# Patient Record
Sex: FEMALE | Race: WHITE | NOT HISPANIC OR LATINO | Employment: FULL TIME | URBAN - METROPOLITAN AREA
[De-identification: names, ages, dates, MRNs, and addresses within clinical notes are randomized per-mention and may not be internally consistent; named-entity substitution may affect disease eponyms.]

---

## 2022-01-22 PROCEDURE — U0004 COV-19 TEST NON-CDC HGH THRU: HCPCS | Performed by: NURSE PRACTITIONER

## 2022-01-24 ENCOUNTER — TELEPHONE (OUTPATIENT)
Dept: URGENT CARE | Facility: CLINIC | Age: 42
End: 2022-01-24

## 2022-08-26 ENCOUNTER — OFFICE VISIT (OUTPATIENT)
Dept: INTERNAL MEDICINE | Facility: CLINIC | Age: 42
End: 2022-08-26

## 2022-08-26 VITALS
SYSTOLIC BLOOD PRESSURE: 124 MMHG | OXYGEN SATURATION: 96 % | BODY MASS INDEX: 22.19 KG/M2 | HEART RATE: 100 BPM | WEIGHT: 125.25 LBS | TEMPERATURE: 98.5 F | HEIGHT: 63 IN | DIASTOLIC BLOOD PRESSURE: 83 MMHG

## 2022-08-26 DIAGNOSIS — N89.8 VAGINAL DISCHARGE: ICD-10-CM

## 2022-08-26 DIAGNOSIS — F41.9 ANXIETY: ICD-10-CM

## 2022-08-26 DIAGNOSIS — Z11.59 NEED FOR HEPATITIS C SCREENING TEST: ICD-10-CM

## 2022-08-26 DIAGNOSIS — Z00.00 ENCOUNTER FOR MEDICAL EXAMINATION TO ESTABLISH CARE: Primary | ICD-10-CM

## 2022-08-26 DIAGNOSIS — G47.00 INSOMNIA, UNSPECIFIED TYPE: ICD-10-CM

## 2022-08-26 DIAGNOSIS — Z13.220 SCREENING FOR LIPID DISORDERS: ICD-10-CM

## 2022-08-26 DIAGNOSIS — F39 MOOD DISORDER: ICD-10-CM

## 2022-08-26 DIAGNOSIS — Z85.41 HISTORY OF CERVICAL CANCER: ICD-10-CM

## 2022-08-26 DIAGNOSIS — Z00.00 ANNUAL PHYSICAL EXAM: ICD-10-CM

## 2022-08-26 LAB
ALBUMIN SERPL-MCNC: 4.5 G/DL (ref 3.5–5.2)
ALBUMIN/GLOB SERPL: 2 G/DL
ALP SERPL-CCNC: 63 U/L (ref 39–117)
ALT SERPL W P-5'-P-CCNC: 11 U/L (ref 1–33)
ANION GAP SERPL CALCULATED.3IONS-SCNC: 10.5 MMOL/L (ref 5–15)
AST SERPL-CCNC: 19 U/L (ref 1–32)
BASOPHILS # BLD AUTO: 0.05 10*3/MM3 (ref 0–0.2)
BASOPHILS NFR BLD AUTO: 0.5 % (ref 0–1.5)
BILIRUB SERPL-MCNC: 0.2 MG/DL (ref 0–1.2)
BUN SERPL-MCNC: 6 MG/DL (ref 6–20)
BUN/CREAT SERPL: 9.1 (ref 7–25)
C TRACH RRNA CVX QL NAA+PROBE: NOT DETECTED
CALCIUM SPEC-SCNC: 9.9 MG/DL (ref 8.6–10.5)
CANDIDA SPECIES: POSITIVE
CHLORIDE SERPL-SCNC: 101 MMOL/L (ref 98–107)
CHOLEST SERPL-MCNC: 238 MG/DL (ref 0–200)
CO2 SERPL-SCNC: 25.5 MMOL/L (ref 22–29)
CREAT SERPL-MCNC: 0.66 MG/DL (ref 0.57–1)
DEPRECATED RDW RBC AUTO: 39.6 FL (ref 37–54)
EGFRCR SERPLBLD CKD-EPI 2021: 111.8 ML/MIN/1.73
EOSINOPHIL # BLD AUTO: 0.12 10*3/MM3 (ref 0–0.4)
EOSINOPHIL NFR BLD AUTO: 1.1 % (ref 0.3–6.2)
ERYTHROCYTE [DISTWIDTH] IN BLOOD BY AUTOMATED COUNT: 11.7 % (ref 12.3–15.4)
GARDNERELLA VAGINALIS: NEGATIVE
GLOBULIN UR ELPH-MCNC: 2.2 GM/DL
GLUCOSE SERPL-MCNC: 91 MG/DL (ref 65–99)
HCT VFR BLD AUTO: 44 % (ref 34–46.6)
HCV AB SER DONR QL: NORMAL
HDLC SERPL-MCNC: 50 MG/DL (ref 40–60)
HGB BLD-MCNC: 14.8 G/DL (ref 12–15.9)
IMM GRANULOCYTES # BLD AUTO: 0.03 10*3/MM3 (ref 0–0.05)
IMM GRANULOCYTES NFR BLD AUTO: 0.3 % (ref 0–0.5)
LDLC SERPL CALC-MCNC: 162 MG/DL (ref 0–100)
LDLC/HDLC SERPL: 3.18 {RATIO}
LYMPHOCYTES # BLD AUTO: 3.86 10*3/MM3 (ref 0.7–3.1)
LYMPHOCYTES NFR BLD AUTO: 35.1 % (ref 19.6–45.3)
MCH RBC QN AUTO: 31 PG (ref 26.6–33)
MCHC RBC AUTO-ENTMCNC: 33.6 G/DL (ref 31.5–35.7)
MCV RBC AUTO: 92.2 FL (ref 79–97)
MONOCYTES # BLD AUTO: 0.6 10*3/MM3 (ref 0.1–0.9)
MONOCYTES NFR BLD AUTO: 5.5 % (ref 5–12)
N GONORRHOEA RRNA SPEC QL NAA+PROBE: NOT DETECTED
NEUTROPHILS NFR BLD AUTO: 57.5 % (ref 42.7–76)
NEUTROPHILS NFR BLD AUTO: 6.33 10*3/MM3 (ref 1.7–7)
NRBC BLD AUTO-RTO: 0 /100 WBC (ref 0–0.2)
PLATELET # BLD AUTO: 387 10*3/MM3 (ref 140–450)
PMV BLD AUTO: 9.7 FL (ref 6–12)
POTASSIUM SERPL-SCNC: 3.6 MMOL/L (ref 3.5–5.2)
PROT SERPL-MCNC: 6.7 G/DL (ref 6–8.5)
RBC # BLD AUTO: 4.77 10*6/MM3 (ref 3.77–5.28)
SODIUM SERPL-SCNC: 137 MMOL/L (ref 136–145)
T VAGINALIS DNA VAG QL PROBE+SIG AMP: POSITIVE
TRIGL SERPL-MCNC: 146 MG/DL (ref 0–150)
TSH SERPL DL<=0.05 MIU/L-ACNC: 1.25 UIU/ML (ref 0.27–4.2)
VLDLC SERPL-MCNC: 26 MG/DL (ref 5–40)
WBC NRBC COR # BLD: 10.99 10*3/MM3 (ref 3.4–10.8)

## 2022-08-26 PROCEDURE — 87480 CANDIDA DNA DIR PROBE: CPT | Performed by: NURSE PRACTITIONER

## 2022-08-26 PROCEDURE — 87591 N.GONORRHOEAE DNA AMP PROB: CPT | Performed by: NURSE PRACTITIONER

## 2022-08-26 PROCEDURE — 85025 COMPLETE CBC W/AUTO DIFF WBC: CPT | Performed by: NURSE PRACTITIONER

## 2022-08-26 PROCEDURE — 80061 LIPID PANEL: CPT | Performed by: NURSE PRACTITIONER

## 2022-08-26 PROCEDURE — 87510 GARDNER VAG DNA DIR PROBE: CPT | Performed by: NURSE PRACTITIONER

## 2022-08-26 PROCEDURE — 99396 PREV VISIT EST AGE 40-64: CPT | Performed by: NURSE PRACTITIONER

## 2022-08-26 PROCEDURE — 84443 ASSAY THYROID STIM HORMONE: CPT | Performed by: NURSE PRACTITIONER

## 2022-08-26 PROCEDURE — 86803 HEPATITIS C AB TEST: CPT | Performed by: NURSE PRACTITIONER

## 2022-08-26 PROCEDURE — 80053 COMPREHEN METABOLIC PANEL: CPT | Performed by: NURSE PRACTITIONER

## 2022-08-26 PROCEDURE — 87660 TRICHOMONAS VAGIN DIR PROBE: CPT | Performed by: NURSE PRACTITIONER

## 2022-08-26 PROCEDURE — 87491 CHLMYD TRACH DNA AMP PROBE: CPT | Performed by: NURSE PRACTITIONER

## 2022-08-26 NOTE — PROGRESS NOTES
Chief Complaint  Establish care: cervical cancer   Subjective          Janee Mensah presents to [facility]  Anxiety  Presents for initial visit. Symptoms include depressed mood, excessive worry, irritability, nervous/anxious behavior and panic. Patient reports no chest pain, compulsions, confusion, decreased concentration, dizziness, dry mouth, feeling of choking, hyperventilation, impotence, insomnia, malaise, muscle tension, nausea, obsessions, palpitations, restlessness, shortness of breath or suicidal ideas.     Risk factors:  passed away 6 years ago  Treatments tried: currently on hydroxyzine and lamictal    Insomnia  This is a chronic problem. Pertinent negatives include no abdominal pain, anorexia, arthralgias, change in bowel habit, chest pain, chills, congestion, coughing, diaphoresis, fatigue, fever, headaches, joint swelling, myalgias, nausea, neck pain, numbness, rash, sore throat, swollen glands, urinary symptoms, vertigo, visual change, vomiting or weakness. Treatments tried: trazodone  The treatment provided significant relief.   Vaginal Discharge  The patient's primary symptoms include a genital odor and vaginal discharge. The patient's pertinent negatives include no genital itching, genital lesions, genital rash, missed menses, pelvic pain or vaginal bleeding. This is a new problem. The current episode started 1 to 4 weeks ago. Pertinent negatives include no abdominal pain, anorexia, back pain, chills, constipation, diarrhea, discolored urine, dysuria, fever, flank pain, frequency, headaches, hematuria, joint pain, joint swelling, nausea, painful intercourse, rash, sore throat, urgency or vomiting. The vaginal discharge was white, yellow and thick. There has been no bleeding. She has not been passing clots. She has not been passing tissue. Nothing aggravates the symptoms.       Previous PCP: did not have one  Specialist(s): OB/GYN yearly  "  COVID vaccine: never  Pneumonia vaccine: never  Shingles vaccine: never  Colon cancer screening: completed   Mammogram: completed  Pap Smear: completed      Cervical cancer - sees [facility] in [facility]   Pt is very difficult to understand while obtaining history. I repeated back her complaints to be sure we are on the same page.   Pt states that she follows with [facility] in [facility]. Pt would like to switch her oncology care to [place]. I have requested records from [facility] and can place the referral once I obtain them.     Allergic rhinitis:   Well controlled on current regimen         Current Outpatient Medications   Medication Instructions   • albuterol sulfate  (90 Base) MCG/ACT inhaler 2 puffs, Inhalation, Every 4 Hours PRN   • amoxicillin-clavulanate (AUGMENTIN) 875-125 MG per tablet 1 tablet, Oral, 2 Times Daily   • cetirizine (ZYRTEC) 10 mg, Oral, Daily   • hydrOXYzine pamoate (VISTARIL) 25 MG capsule 1 capsule, Oral, 3 Times Daily PRN   • lamoTRIgine (LaMICtal) 100 MG tablet 1 tablet, Oral, Daily   • loratadine (CLARITIN) 10 mg, Oral, Daily   • metroNIDAZOLE (FLAGYL) 500 mg, Oral, 2 Times Daily   • traZODone (DESYREL) 100 mg, Oral, Every Night at Bedtime       The following portions of the patient's history were reviewed and updated as appropriate: allergies, current medications, past family history, past medical history, past social history, past surgical history, and problem list.    Objective   Vital Signs:   /83   Pulse 100   Temp 98.5 °F (36.9 °C) (Temporal)   Ht 160 cm (63\")   Wt 56.8 kg (125 lb 4 oz)   SpO2 96%   BMI 22.19 kg/m²     Wt Readings from Last 3 Encounters:   08/26/22 56.8 kg (125 lb 4 oz)   08/20/22 54.4 kg (120 lb)   01/22/22 56.7 kg (125 lb)     BP Readings from Last 3 Encounters:   08/26/22 124/83   08/20/22 94/62   05/05/22 122/78     Physical Exam   Appearance: No acute distress, well-nourished  Head: normocephalic, atraumatic  Eyes: " extraocular movements intact, no scleral icterus, no conjunctival injection  Ears, Nose, and Throat: external ears normal, nares patent, moist mucous membranes  Cardiovascular: regular rate and rhythm. no murmurs, rubs, or gallops. no edema  Respiratory: breathing comfortably, symmetric chest rise, clear to auscultation bilaterally. No wheezes, rales, or rhonchi.  Neuro: alert and oriented to time, place, and person. Normal gait  Psych: normal mood and affect     Result Review :   The following data was reviewed by: ANGY Limon on 08/26/2022:  Common labs    Common Labsle 8/26/22 8/26/22 8/26/22    1625 1625 1625   Glucose   91   BUN   6   Creatinine   0.66   Sodium   137   Potassium   3.6   Chloride   101   Calcium   9.9   Albumin   4.50   Total Bilirubin   0.2   Alkaline Phosphatase   63   AST (SGOT)   19   ALT (SGPT)   11   WBC 10.99 (A)     Hemoglobin 14.8     Hematocrit 44.0     Platelets 387     Total Cholesterol  238 (A)    Triglycerides  146    HDL Cholesterol  50    LDL Cholesterol   162 (A)    (A) Abnormal value                   Lab Results   Component Value Date    COVID19 Detected (C) 01/22/2022       Procedures        Assessment and Plan    Diagnoses and all orders for this visit:    1. Encounter for medical examination to establish care (Primary)    2. History of cervical cancer  -     Ambulatory Referral to Obstetrics / Gynecology    3. Vaginal discharge  -     Chlamydia trachomatis, Neisseria gonorrhoeae, PCR - , Urine, Random Void  -     Gardnerella vaginalis, Trichomonas vaginalis, Candida albicans, DNA - Swab, Vagina    4. Need for hepatitis C screening test  -     Hepatitis C Antibody    5. Annual physical exam  -     CBC & Differential  -     TSH Rfx On Abnormal To Free T4  -     Comprehensive Metabolic Panel    6. Screening for lipid disorders  -     Lipid Panel    7. Insomnia, unspecified type    8. Mood disorder (HCC)    9. Anxiety        Advised on diet, physical activity,  sunscreen, helmet, texting and driving, etc    Medications Discontinued During This Encounter   Medication Reason   • hydrOXYzine (ATARAX) 25 MG tablet Duplicate order          Follow Up   Return in about 6 months (around 2/26/2023).  Patient was given instructions and counseling regarding her condition or for health maintenance advice. Please see specific information pulled into the AVS if appropriate.       ANGY Limon  08/30/22  08:06 EDT

## 2022-08-28 DIAGNOSIS — A59.01 TRICHOMONAS VAGINALIS (TV) INFECTION: Primary | ICD-10-CM

## 2022-08-28 DIAGNOSIS — B37.31 VAGINAL YEAST INFECTION: ICD-10-CM

## 2022-08-28 RX ORDER — FLUCONAZOLE 150 MG/1
150 TABLET ORAL ONCE
Qty: 1 TABLET | Refills: 0 | Status: SHIPPED | OUTPATIENT
Start: 2022-08-28 | End: 2022-08-28

## 2022-08-28 RX ORDER — METRONIDAZOLE 500 MG/1
500 TABLET ORAL 2 TIMES DAILY
Qty: 14 TABLET | Refills: 0 | Status: SHIPPED | OUTPATIENT
Start: 2022-08-28 | End: 2022-09-04

## 2022-08-29 ENCOUNTER — TELEPHONE (OUTPATIENT)
Dept: INTERNAL MEDICINE | Facility: CLINIC | Age: 42
End: 2022-08-29

## 2022-08-29 NOTE — TELEPHONE ENCOUNTER
Left message for patient to call back clinic.    HUB TO READ:    ----- Message from ANGY Limon sent at 8/28/2022  7:10 PM EDT -----  Cholesterol levels are elevated nearing need for medication. I recommend healthy diet and increased physical activity to prevent the need for medications. We will recheck lipids in 3 months. Please be sure pt has appt.   I'll put diet recommendations below        Gonorrhea and Chlamydia negative.      Positive for trichomonas. This is a sexually transmitted infection. Partner will also need to be treated. I am sending in 7 days of flagyl to the pharmacy. Remain abstinent until treatment is complete with yourself and partner.   Also positive for yeast. I am sending in Diflucan as well.

## 2022-08-29 NOTE — TELEPHONE ENCOUNTER
----- Message from ANGY Limon sent at 8/28/2022  7:10 PM EDT -----  Cholesterol levels are elevated nearing need for medication. I recommend healthy diet and increased physical activity to prevent the need for medications. We will recheck lipids in 3 months. Please be sure pt has appt.   I'll put diet recommendations below      Gonorrhea and Chlamydia negative.     Positive for trichomonas. This is a sexually transmitted infection. Partner will also need to be treated. I am sending in 7 days of flagyl to the pharmacy. Remain abstinent until treatment is complete with yourself and partner.   Also positive for yeast. I am sending in Diflucan as well.

## 2022-08-30 PROBLEM — G47.00 INSOMNIA: Status: ACTIVE | Noted: 2022-08-30

## 2022-08-30 PROBLEM — F39 MOOD DISORDER: Status: ACTIVE | Noted: 2022-08-30

## 2022-08-30 PROBLEM — F41.9 ANXIETY: Status: ACTIVE | Noted: 2022-08-30

## 2022-08-30 NOTE — TELEPHONE ENCOUNTER
2ND ATTEMPTED TO CONTACT PATIENT. LEFT MESSAGE TO CALL BACK.    HUB OK TO READ/ADVISE:     ----- Message from ANGY Limon sent at 8/28/2022  7:10 PM EDT -----  Cholesterol levels are elevated nearing need for medication. I recommend healthy diet and increased physical activity to prevent the need for medications. We will recheck lipids in 3 months. Please be sure pt has appt.   I'll put diet recommendations below        Gonorrhea and Chlamydia negative.      Positive for trichomonas. This is a sexually transmitted infection. Partner will also need to be treated. I am sending in 7 days of flagyl to the pharmacy. Remain abstinent until treatment is complete with yourself and partner.   Also positive for yeast. I am sending in Diflucan as well.

## 2022-08-30 NOTE — TELEPHONE ENCOUNTER
Patient is aware of results. There were no further questions or concerns noted.     Results were also mailed as requested by the patient.

## 2022-09-30 ENCOUNTER — OFFICE VISIT (OUTPATIENT)
Dept: INTERNAL MEDICINE | Facility: CLINIC | Age: 42
End: 2022-09-30

## 2022-09-30 VITALS
SYSTOLIC BLOOD PRESSURE: 112 MMHG | TEMPERATURE: 97.8 F | HEART RATE: 80 BPM | BODY MASS INDEX: 22.75 KG/M2 | HEIGHT: 63 IN | WEIGHT: 128.38 LBS | OXYGEN SATURATION: 98 % | DIASTOLIC BLOOD PRESSURE: 74 MMHG

## 2022-09-30 DIAGNOSIS — N89.8 VAGINAL DISCHARGE: ICD-10-CM

## 2022-09-30 DIAGNOSIS — Z11.3 SCREENING EXAMINATION FOR STD (SEXUALLY TRANSMITTED DISEASE): Primary | ICD-10-CM

## 2022-09-30 LAB
C TRACH RRNA CVX QL NAA+PROBE: NOT DETECTED
CANDIDA SPECIES: NEGATIVE
GARDNERELLA VAGINALIS: POSITIVE
N GONORRHOEA RRNA SPEC QL NAA+PROBE: NOT DETECTED
T VAGINALIS DNA VAG QL PROBE+SIG AMP: NEGATIVE

## 2022-09-30 PROCEDURE — 87510 GARDNER VAG DNA DIR PROBE: CPT | Performed by: NURSE PRACTITIONER

## 2022-09-30 PROCEDURE — 87660 TRICHOMONAS VAGIN DIR PROBE: CPT | Performed by: NURSE PRACTITIONER

## 2022-09-30 PROCEDURE — 99213 OFFICE O/P EST LOW 20 MIN: CPT | Performed by: NURSE PRACTITIONER

## 2022-09-30 PROCEDURE — 87480 CANDIDA DNA DIR PROBE: CPT | Performed by: NURSE PRACTITIONER

## 2022-09-30 PROCEDURE — 87491 CHLMYD TRACH DNA AMP PROBE: CPT | Performed by: NURSE PRACTITIONER

## 2022-09-30 PROCEDURE — 87591 N.GONORRHOEAE DNA AMP PROB: CPT | Performed by: NURSE PRACTITIONER

## 2022-09-30 RX ORDER — LAMOTRIGINE 150 MG/1
150 TABLET ORAL DAILY
COMMUNITY
Start: 2022-09-06

## 2022-10-01 DIAGNOSIS — B96.89 BACTERIAL VAGINOSIS: Primary | ICD-10-CM

## 2022-10-01 DIAGNOSIS — N76.0 BACTERIAL VAGINOSIS: Primary | ICD-10-CM

## 2022-10-01 RX ORDER — CLINDAMYCIN PHOSPHATE 20 MG/G
1 CREAM VAGINAL NIGHTLY
Qty: 40 G | Refills: 0 | Status: SHIPPED | OUTPATIENT
Start: 2022-10-01 | End: 2022-10-08

## 2022-10-03 ENCOUNTER — TELEPHONE (OUTPATIENT)
Dept: INTERNAL MEDICINE | Facility: CLINIC | Age: 42
End: 2022-10-03

## 2022-10-03 ENCOUNTER — PRIOR AUTHORIZATION (OUTPATIENT)
Dept: INTERNAL MEDICINE | Facility: CLINIC | Age: 42
End: 2022-10-03

## 2022-10-03 NOTE — TELEPHONE ENCOUNTER
Left message for patient to return call to clinic.      HUB TO READ:      Positive for bacterial vaginosis.  I sent in vaginal clindamycin that patient is can insert nightly for the next 7 days.  She may return for a new vaginal screen after completion of treatment if symptoms persist

## 2022-10-04 NOTE — TELEPHONE ENCOUNTER
PATIENT GIVEN HUB TO READ MESSAGE. PATIENT STATES SHE UNDERSTAND AND DOESN'T ANY FOLLOW UP QUESTIONS AT THIS TIME.

## 2022-10-06 ENCOUNTER — TELEPHONE (OUTPATIENT)
Dept: INTERNAL MEDICINE | Facility: CLINIC | Age: 42
End: 2022-10-06

## 2022-10-06 NOTE — TELEPHONE ENCOUNTER
ATTEMPTED TO CONTACT PATIENT LEFT MESSAGE FOR PATIENT TO CALL THE OFFICE BACK.       HUB OK TO READ/ADVISE:    Patient has overdue labs for:   Hepatitis panel, acute  HIV-1/O/2 ANTIGEN/ANTIBODY, 4TH GENERATION   RPR    These orders can be collected at one of the locations listed below:    [facility]:  [address] [place] [address]    Phone: [phone]      [facility]:  [address] [place] [address]    Phone: [phone]    Hours of Operations: 6:30 a.m. - 5:00 p.m. (Monday-Friday)      No appointment is required at either location.

## 2022-10-07 ENCOUNTER — CLINICAL SUPPORT (OUTPATIENT)
Dept: INTERNAL MEDICINE | Facility: CLINIC | Age: 42
End: 2022-10-07

## 2022-10-07 DIAGNOSIS — G47.00 INSOMNIA, UNSPECIFIED TYPE: ICD-10-CM

## 2022-10-07 DIAGNOSIS — F41.9 ANXIETY: ICD-10-CM

## 2022-10-07 LAB
HAV IGM SERPL QL IA: NORMAL
HBV CORE IGM SERPL QL IA: NORMAL
HBV SURFACE AG SERPL QL IA: NORMAL
HCV AB SER DONR QL: NORMAL
HIV1+2 AB SER QL: NORMAL

## 2022-10-07 PROCEDURE — G0432 EIA HIV-1/HIV-2 SCREEN: HCPCS | Performed by: NURSE PRACTITIONER

## 2022-10-07 PROCEDURE — 86592 SYPHILIS TEST NON-TREP QUAL: CPT | Performed by: NURSE PRACTITIONER

## 2022-10-07 PROCEDURE — 36415 COLL VENOUS BLD VENIPUNCTURE: CPT | Performed by: NURSE PRACTITIONER

## 2022-10-07 PROCEDURE — 80074 ACUTE HEPATITIS PANEL: CPT | Performed by: NURSE PRACTITIONER

## 2022-10-08 LAB — RPR SER QL: NORMAL

## 2022-10-12 ENCOUNTER — TELEPHONE (OUTPATIENT)
Dept: INTERNAL MEDICINE | Facility: CLINIC | Age: 42
End: 2022-10-12

## 2022-10-12 NOTE — TELEPHONE ENCOUNTER
Attempted to contact patient. Left message to call the office back.    HUB OK TO READ/ADVISE:  Syphilis negative.      HIV negative.      Hepatitis panel non reactive.

## 2022-10-12 NOTE — TELEPHONE ENCOUNTER
Patient returned call, informed of results.  Patient verbalized understanding.    Patient notified medication was sent to the pharmacy. Patient stated medication was not approved by insurance.    PA was done for medication and approved.  Approval letter has been faxed to the pharmacy.    Pt aware.

## 2022-10-12 NOTE — TELEPHONE ENCOUNTER
ATTEMPTED TO CONTACT PATIENT REGARDING LAB RESULTS- NO ANSWER, LEFT LVM FOR PATIENT TO CALL OFFICE BACK.

## 2022-10-12 NOTE — TELEPHONE ENCOUNTER
----- Message from ANGY Limon sent at 10/12/2022  8:29 AM EDT -----  Syphilis negative.     HIV negative.     Hepatitis panel non reactive.

## 2022-10-13 NOTE — TELEPHONE ENCOUNTER
2nd attempt to contact patient. Left message to call the office back.     HUB OK TO READ/ADVISE:  Syphilis negative.      HIV negative.      Hepatitis panel non reactive.

## 2022-10-14 NOTE — TELEPHONE ENCOUNTER
3RD ATTEMPT TO CONTACT PATIENT. LEFT MESSAGE TO CALL THE OFFICE BACK.     HUB OK TO READ/ADVISE:  Syphilis negative.      HIV negative.      Hepatitis panel non reactive.

## 2022-10-19 ENCOUNTER — TELEPHONE (OUTPATIENT)
Dept: INTERNAL MEDICINE | Facility: CLINIC | Age: 42
End: 2022-10-19

## 2022-10-19 NOTE — TELEPHONE ENCOUNTER
3RD -11-22-22 REFAXED MEDICAL RECORDS REQUEST TO [facility]/        ----- Message from ANGY Limon sent at 8/26/2022  4:16 PM EDT -----  [facility] in [facility]       2ND -10/19/22 REFAXED MEDICAL RECORDS REQUEST TO [facility]  [facility]/    1ST - 8/29/22 FAXED MEDICAL RECORDS REQUEST TO [facility]/

## 2022-10-28 ENCOUNTER — OFFICE VISIT (OUTPATIENT)
Dept: INTERNAL MEDICINE | Facility: CLINIC | Age: 42
End: 2022-10-28

## 2022-10-28 VITALS
DIASTOLIC BLOOD PRESSURE: 70 MMHG | HEART RATE: 81 BPM | HEIGHT: 63 IN | TEMPERATURE: 98.3 F | SYSTOLIC BLOOD PRESSURE: 104 MMHG | BODY MASS INDEX: 22.39 KG/M2 | OXYGEN SATURATION: 98 % | WEIGHT: 126.38 LBS

## 2022-10-28 DIAGNOSIS — N76.0 BACTERIAL VAGINOSIS: ICD-10-CM

## 2022-10-28 DIAGNOSIS — R09.81 CONGESTION OF NASAL SINUS: ICD-10-CM

## 2022-10-28 DIAGNOSIS — R30.0 DYSURIA: Primary | ICD-10-CM

## 2022-10-28 DIAGNOSIS — B96.89 BACTERIAL VAGINOSIS: ICD-10-CM

## 2022-10-28 LAB
BILIRUB BLD-MCNC: NEGATIVE MG/DL
BILIRUB UR QL STRIP: NEGATIVE
CANDIDA SPECIES: NEGATIVE
CLARITY UR: CLEAR
CLARITY, POC: CLEAR
COLOR UR: ABNORMAL
COLOR UR: YELLOW
EXPIRATION DATE: ABNORMAL
GARDNERELLA VAGINALIS: POSITIVE
GLUCOSE UR STRIP-MCNC: NEGATIVE MG/DL
GLUCOSE UR STRIP-MCNC: NEGATIVE MG/DL
HGB UR QL STRIP.AUTO: NEGATIVE
KETONES UR QL STRIP: NEGATIVE
KETONES UR QL: NEGATIVE
LEUKOCYTE EST, POC: ABNORMAL
LEUKOCYTE ESTERASE UR QL STRIP.AUTO: ABNORMAL
Lab: ABNORMAL
NITRITE UR QL STRIP: NEGATIVE
NITRITE UR-MCNC: NEGATIVE MG/ML
PH UR STRIP.AUTO: 6 [PH] (ref 5–8)
PH UR: 6 [PH] (ref 5–8)
PROT UR QL STRIP: NEGATIVE
PROT UR STRIP-MCNC: ABNORMAL MG/DL
RBC # UR STRIP: NEGATIVE /UL
SP GR UR STRIP: 1.02 (ref 1–1.03)
SP GR UR: 1.03 (ref 1–1.03)
T VAGINALIS DNA VAG QL PROBE+SIG AMP: NEGATIVE
UROBILINOGEN UR QL STRIP: ABNORMAL
UROBILINOGEN UR QL: NORMAL

## 2022-10-28 PROCEDURE — 99214 OFFICE O/P EST MOD 30 MIN: CPT | Performed by: NURSE PRACTITIONER

## 2022-10-28 PROCEDURE — 87510 GARDNER VAG DNA DIR PROBE: CPT | Performed by: NURSE PRACTITIONER

## 2022-10-28 PROCEDURE — 87186 SC STD MICRODIL/AGAR DIL: CPT | Performed by: NURSE PRACTITIONER

## 2022-10-28 PROCEDURE — 87077 CULTURE AEROBIC IDENTIFY: CPT | Performed by: NURSE PRACTITIONER

## 2022-10-28 PROCEDURE — 81001 URINALYSIS AUTO W/SCOPE: CPT | Performed by: NURSE PRACTITIONER

## 2022-10-28 PROCEDURE — 87480 CANDIDA DNA DIR PROBE: CPT | Performed by: NURSE PRACTITIONER

## 2022-10-28 PROCEDURE — 87660 TRICHOMONAS VAGIN DIR PROBE: CPT | Performed by: NURSE PRACTITIONER

## 2022-10-28 PROCEDURE — 87086 URINE CULTURE/COLONY COUNT: CPT | Performed by: NURSE PRACTITIONER

## 2022-10-28 RX ORDER — PSEUDOEPHEDRINE HCL 120 MG/1
120 TABLET, FILM COATED, EXTENDED RELEASE ORAL EVERY 12 HOURS
Qty: 10 TABLET | Refills: 0 | Status: SHIPPED | OUTPATIENT
Start: 2022-10-28 | End: 2022-11-21

## 2022-10-28 NOTE — PROGRESS NOTES
Chief Complaint  Personal Problem    Subjective          Janee Mensah presents to [facility]  Vaginal Discharge  The patient's primary symptoms include vaginal discharge. The patient's pertinent negatives include no genital itching, genital lesions, genital odor, genital rash, missed menses, pelvic pain or vaginal bleeding. This is a recurrent problem. The current episode started 1 to 4 weeks ago. The problem occurs constantly. Associated symptoms include dysuria. Pertinent negatives include no abdominal pain, anorexia, back pain, chills, constipation, diarrhea, discolored urine, fever, flank pain, frequency, headaches, hematuria, joint pain, joint swelling, nausea, painful intercourse, rash, sore throat, urgency or vomiting. The vaginal discharge was watery and thick. She has tried nothing for the symptoms. She is sexually active.   Sinusitis  This is a new problem. The current episode started in the past 7 days. There has been no fever. Associated symptoms include congestion. Pertinent negatives include no chills, coughing, diaphoresis, ear pain, headaches, hoarse voice, neck pain, shortness of breath, sinus pressure, sneezing, sore throat or swollen glands. Past treatments include oral decongestants. The treatment provided moderate relief.         Current Outpatient Medications   Medication Instructions   • albuterol sulfate  (90 Base) MCG/ACT inhaler 2 puffs, Inhalation, Every 4 Hours PRN   • cetirizine (ZYRTEC) 10 mg, Oral, Daily   • hydrOXYzine pamoate (VISTARIL) 25 MG capsule 1 capsule, Oral, 3 Times Daily PRN   • lamoTRIgine (LAMICTAL) 150 mg, Oral, Daily   • loratadine (CLARITIN) 10 mg, Oral, Daily   • pseudoephedrine (SUDAFED 12 HOUR) 120 mg, Oral, Every 12 Hours   • traZODone (DESYREL) 100 mg, Oral, Every Night at Bedtime       The following portions of the patient's history were reviewed and updated as appropriate: allergies, current  "medications, past family history, past medical history, past social history, past surgical history, and problem list.    Objective   Vital Signs:   /70   Pulse 81   Temp 98.3 °F (36.8 °C) (Temporal)   Ht 160 cm (63\")   Wt 57.3 kg (126 lb 6 oz)   SpO2 98%   BMI 22.39 kg/m²     Wt Readings from Last 3 Encounters:   10/28/22 57.3 kg (126 lb 6 oz)   09/30/22 58.2 kg (128 lb 6 oz)   08/26/22 56.8 kg (125 lb 4 oz)     BP Readings from Last 3 Encounters:   10/28/22 104/70   09/30/22 112/74   08/26/22 124/83     Physical Exam  HENT:      Nose: Congestion present.        Appearance: No acute distress, well-nourished  Head: normocephalic, atraumatic  Eyes: extraocular movements intact, no scleral icterus, no conjunctival injection  Ears, Nose, and Throat:   Cardiovascular: regular rate and rhythm. no murmurs, rubs, or gallops. no edema  Respiratory: breathing comfortably, symmetric chest rise, clear to auscultation bilaterally. No wheezes, rales, or rhonchi.  Neuro: alert and oriented to time, place, and person. Normal gait  Psych: normal mood and affect     Result Review :   The following data was reviewed by: ANGY Limon on 10/28/2022:  Common labs    Common Labs 8/26/22 8/26/22 8/26/22    1625 1625 1625   Glucose   91   BUN   6   Creatinine   0.66   Sodium   137   Potassium   3.6   Chloride   101   Calcium   9.9   Albumin   4.50   Total Bilirubin   0.2   Alkaline Phosphatase   63   AST (SGOT)   19   ALT (SGPT)   11   WBC 10.99 (A)     Hemoglobin 14.8     Hematocrit 44.0     Platelets 387     Total Cholesterol  238 (A)    Triglycerides  146    HDL Cholesterol  50    LDL Cholesterol   162 (A)    (A) Abnormal value                   Lab Results   Component Value Date    COVID19 Detected (C) 01/22/2022    BILIRUBINUR Negative 10/28/2022       Procedures        Assessment and Plan    Diagnoses and all orders for this visit:    1. Dysuria (Primary)  -     Gardnerella vaginalis, Trichomonas vaginalis, " Candida albicans, DNA - Swab, Vagina  -     POCT urinalysis dipstick, automated  -     Urine Culture - Urine, Urine, Clean Catch  -     Urinalysis With Microscopic - Urine, Clean Catch    2. Bacterial vaginosis  -     Gardnerella vaginalis, Trichomonas vaginalis, Candida albicans, DNA - Swab, Vagina  -     POCT urinalysis dipstick, automated    3. Congestion of nasal sinus  -     pseudoephedrine (Sudafed 12 Hour) 120 MG 12 hr tablet; Take 1 tablet by mouth Every 12 (Twelve) Hours.  Dispense: 10 tablet; Refill: 0      Wants repeat BV test. Full STD panel performed 1 week ago. Negative GC and trich.     There are no discontinued medications.       Follow Up   Return if symptoms worsen or fail to improve.  Patient was given instructions and counseling regarding her condition or for health maintenance advice. Please see specific information pulled into the AVS if appropriate.       Carlee Rush, ANGY  10/31/22  07:48 EDT

## 2022-10-29 LAB
BACTERIA UR QL AUTO: ABNORMAL /HPF
HYALINE CASTS UR QL AUTO: ABNORMAL /LPF
RBC # UR STRIP: ABNORMAL /HPF
REF LAB TEST METHOD: ABNORMAL
SQUAMOUS #/AREA URNS HPF: ABNORMAL /HPF
WBC # UR STRIP: ABNORMAL /HPF

## 2022-10-30 LAB — BACTERIA SPEC AEROBE CULT: ABNORMAL

## 2022-10-31 ENCOUNTER — TELEPHONE (OUTPATIENT)
Dept: INTERNAL MEDICINE | Facility: CLINIC | Age: 42
End: 2022-10-31

## 2022-10-31 DIAGNOSIS — N39.0 ACUTE UTI (URINARY TRACT INFECTION): Primary | ICD-10-CM

## 2022-10-31 RX ORDER — NITROFURANTOIN 25; 75 MG/1; MG/1
100 CAPSULE ORAL 2 TIMES DAILY
Qty: 10 CAPSULE | Refills: 0 | Status: SHIPPED | OUTPATIENT
Start: 2022-10-31 | End: 2022-11-05

## 2022-10-31 NOTE — TELEPHONE ENCOUNTER
ATTEMPTED TO CONTACT PT REGARDING LAB RESULTS. LEFT VM TO CALL OUR OFFICE BACK.     HUB OK TO READ/ADVISE:  ----- Message from ANGY Limon sent at 10/31/2022  8:14 AM EDT -----  E coli noted on urine culture.   Since patient is symptomatic. Will send in macrobid x 5 days.

## 2022-10-31 NOTE — TELEPHONE ENCOUNTER
----- Message from ANGY Limon sent at 10/31/2022  8:14 AM EDT -----  E coli noted on urine culture.   Since patient is symptomatic. Will send in macrobid x 5 days.

## 2022-11-04 ENCOUNTER — HOSPITAL ENCOUNTER (EMERGENCY)
Facility: HOSPITAL | Age: 42
Discharge: HOME | End: 2022-11-04
Attending: EMERGENCY MEDICINE | Admitting: EMERGENCY MEDICINE

## 2022-11-04 VITALS
TEMPERATURE: 98.2 F | OXYGEN SATURATION: 99 % | HEART RATE: 81 BPM | RESPIRATION RATE: 18 BRPM | SYSTOLIC BLOOD PRESSURE: 128 MMHG | HEIGHT: 63 IN | WEIGHT: 126.98 LBS | BODY MASS INDEX: 22.5 KG/M2 | DIASTOLIC BLOOD PRESSURE: 78 MMHG

## 2022-11-04 DIAGNOSIS — L03.211 FACIAL CELLULITIS: Primary | ICD-10-CM

## 2022-11-04 PROCEDURE — 99283 EMERGENCY DEPT VISIT LOW MDM: CPT

## 2022-11-04 RX ORDER — HYDROCODONE BITARTRATE AND ACETAMINOPHEN 5; 325 MG/1; MG/1
1 TABLET ORAL EVERY 6 HOURS PRN
Qty: 8 TABLET | Refills: 0 | Status: SHIPPED | OUTPATIENT
Start: 2022-11-04 | End: 2023-01-20

## 2022-11-04 RX ORDER — CLINDAMYCIN HYDROCHLORIDE 150 MG/1
450 CAPSULE ORAL 3 TIMES DAILY
Qty: 90 CAPSULE | Refills: 0 | Status: SHIPPED | OUTPATIENT
Start: 2022-11-04 | End: 2022-11-14

## 2022-11-04 RX ADMIN — CLINDAMYCIN HYDROCHLORIDE 450 MG: 300 CAPSULE ORAL at 10:45

## 2022-11-04 NOTE — ED PROVIDER NOTES
Time: 10:05 AM EDT    Chief Complaint: cyst on face  History provided by: pt  History is limited by: N/A     History of Present Illness:  Patient is a 42 y.o. year old female who presents to the emergency department with cyst on the right side of the face. She reports that the cyst has been present for about a week. Pt states that pain is 4/10. Pt's pain reproduces with touch. Denies any pain in the teeth. Denies any know allergies specifically to any antibiotics.      History provided by:  Patient   used: No        Similar Symptoms Previously: no  Recently seen: no      Patient Care Team  Primary Care Provider: Carlee Rush APRN    Past Medical History:     No Known Allergies  Past Medical History:   Diagnosis Date   • Anxiety and depression    • Cervical cancer, FIGO stage I (HCC)      History reviewed. No pertinent surgical history.  History reviewed. No pertinent family history.    Home Medications:  Prior to Admission medications    Medication Sig Start Date End Date Taking? Authorizing Provider   albuterol sulfate  (90 Base) MCG/ACT inhaler Inhale 2 puffs Every 4 (Four) Hours As Needed for Wheezing or Shortness of Air. 1/22/22   Sierra Kevin APRN   cetirizine (zyrTEC) 10 MG tablet Take 10 mg by mouth Daily. 10/13/21   Emergency, Nurse VIPUL Barrera   hydrOXYzine pamoate (VISTARIL) 25 MG capsule Take 1 capsule by mouth 3 (Three) Times a Day As Needed. 9/16/21   Emergency, Nurse Bruce RN   lamoTRIgine (LaMICtal) 150 MG tablet Take 150 mg by mouth Daily. 9/6/22   Provider, MD Payam   loratadine (CLARITIN) 10 MG tablet Take 1 tablet by mouth Daily. 8/20/22   Moiz Krishnan DO   nitrofurantoin, macrocrystal-monohydrate, (Macrobid) 100 MG capsule Take 1 capsule by mouth 2 (Two) Times a Day for 5 days. 10/31/22 11/5/22  Carlee Rush APRN   pseudoephedrine (Sudafed 12 Hour) 120 MG 12 hr tablet Take 1 tablet by mouth Every 12 (Twelve) Hours. 10/28/22    "Carlee Rush APRN   traZODone (DESYREL) 100 MG tablet Take 100 mg by mouth every night at bedtime. 4/27/22   Emergency, Nurse Bruce, RN        Social History:   Social History     Tobacco Use   • Smoking status: Every Day     Packs/day: 1.00     Years: 30.00     Pack years: 30.00     Types: Cigarettes   • Smokeless tobacco: Never   Vaping Use   • Vaping Use: Never used   Substance Use Topics   • Alcohol use: Not Currently   • Drug use: Never         Review of Systems:  Review of Systems   Constitutional: Negative for chills and fever.   HENT: Negative for congestion, dental problem, ear pain and sore throat.    Eyes: Negative for pain.   Respiratory: Negative for cough, chest tightness and shortness of breath.    Cardiovascular: Negative for chest pain.   Gastrointestinal: Negative for abdominal pain, diarrhea, nausea and vomiting.   Genitourinary: Negative for flank pain and hematuria.   Musculoskeletal: Negative for joint swelling.   Skin: Negative for pallor.   Neurological: Negative for seizures and headaches.   All other systems reviewed and are negative.       Physical Exam:  /76 (BP Location: Left arm, Patient Position: Sitting)   Pulse 83   Temp 98.2 °F (36.8 °C) (Oral)   Resp 18   Ht 160 cm (63\")   Wt 57.6 kg (126 lb 15.8 oz)   LMP 10/12/2022 (Approximate)   SpO2 99%   BMI 22.49 kg/m²     Physical Exam  Vitals and nursing note reviewed.   Constitutional:       General: She is not in acute distress.     Appearance: Normal appearance. She is not toxic-appearing.      Comments: Pt had a hard time and difficulty tolerating the exam   HENT:      Head: Normocephalic and atraumatic.      Jaw: There is normal jaw occlusion.      Nose:        Comments:     Eyes:      General: Lids are normal.      Extraocular Movements: Extraocular movements intact.      Conjunctiva/sclera: Conjunctivae normal.      Pupils: Pupils are equal, round, and reactive to light.   Cardiovascular:      Rate and Rhythm: " Normal rate and regular rhythm.      Pulses: Normal pulses.      Heart sounds: Normal heart sounds.   Pulmonary:      Effort: Pulmonary effort is normal. No respiratory distress.      Breath sounds: Normal breath sounds. No wheezing or rhonchi.   Abdominal:      General: Abdomen is flat.      Palpations: Abdomen is soft.      Tenderness: There is no abdominal tenderness. There is no guarding or rebound.   Musculoskeletal:         General: Normal range of motion.      Cervical back: Normal range of motion and neck supple.      Right lower leg: No edema.      Left lower leg: No edema.   Skin:     General: Skin is warm and dry.   Neurological:      Mental Status: She is alert and oriented to person, place, and time. Mental status is at baseline.   Psychiatric:         Mood and Affect: Mood normal.                Medications in the Emergency Department:  Medications   clindamycin (CLEOCIN) capsule 450 mg (has no administration in time range)        Labs  Lab Results (last 24 hours)     ** No results found for the last 24 hours. **           Imaging:  No Radiology Exams Resulted Within Past 24 Hours    Procedures:  Procedures    Progress                            Medical Decision Making:  MDM  Number of Diagnoses or Management Options  Facial cellulitis  Diagnosis management comments: In summary this is a 42-year-old female who presents to the emergency department for evaluation of right facial swelling.  She complains of pain, swelling, redness to the right face.  On examination she does have a cellulitis of the right nasolabial fold.  No definite fluctuance however area was very tender and examination was difficult due to the tenderness.  Patient was given initial dose of oral clindamycin in the emergency department and will be prescribed clindamycin for home treatment.  I instructed her to follow-up with her PCP beginning of this upcoming week for reevaluation.  She is to return to the emergency department should the  swelling/redness spread to more of the face.  Very strict return to ER and follow-up instructions have been provided to the patient.         Final diagnoses:   Facial cellulitis        Disposition:  ED Disposition     ED Disposition   Discharge    Condition   Stable    Comment   --             This medical record created using voice recognition software.        Documentation assistance provided by Humaira Hernandez acting as scribe for Max Gaffney MD. Information recorded by the scribe was done at my direction and has been verified and validated by me.          Humaira Hernandez  11/04/22 1028       Humaira Hernandez  11/04/22 1028       Humaira Hernandez  11/04/22 1029       Max Gaffney MD  11/04/22 1038

## 2022-12-02 ENCOUNTER — OFFICE VISIT (OUTPATIENT)
Dept: OBSTETRICS AND GYNECOLOGY | Facility: CLINIC | Age: 42
End: 2022-12-02

## 2022-12-02 VITALS — HEART RATE: 94 BPM | DIASTOLIC BLOOD PRESSURE: 77 MMHG | SYSTOLIC BLOOD PRESSURE: 111 MMHG

## 2022-12-02 DIAGNOSIS — Z01.419 WELL WOMAN EXAM WITH ROUTINE GYNECOLOGICAL EXAM: Primary | ICD-10-CM

## 2022-12-02 PROBLEM — Z72.0 TOBACCO USE: Status: ACTIVE | Noted: 2022-12-02

## 2022-12-02 LAB
C TRACH RRNA CVX QL NAA+PROBE: NOT DETECTED
N GONORRHOEA RRNA SPEC QL NAA+PROBE: NOT DETECTED

## 2022-12-02 PROCEDURE — 2014F MENTAL STATUS ASSESS: CPT | Performed by: STUDENT IN AN ORGANIZED HEALTH CARE EDUCATION/TRAINING PROGRAM

## 2022-12-02 PROCEDURE — 87491 CHLMYD TRACH DNA AMP PROBE: CPT | Performed by: STUDENT IN AN ORGANIZED HEALTH CARE EDUCATION/TRAINING PROGRAM

## 2022-12-02 PROCEDURE — 87591 N.GONORRHOEAE DNA AMP PROB: CPT | Performed by: STUDENT IN AN ORGANIZED HEALTH CARE EDUCATION/TRAINING PROGRAM

## 2022-12-02 PROCEDURE — 87624 HPV HI-RISK TYP POOLED RSLT: CPT | Performed by: STUDENT IN AN ORGANIZED HEALTH CARE EDUCATION/TRAINING PROGRAM

## 2022-12-02 PROCEDURE — G0123 SCREEN CERV/VAG THIN LAYER: HCPCS | Performed by: STUDENT IN AN ORGANIZED HEALTH CARE EDUCATION/TRAINING PROGRAM

## 2022-12-02 PROCEDURE — 99386 PREV VISIT NEW AGE 40-64: CPT | Performed by: STUDENT IN AN ORGANIZED HEALTH CARE EDUCATION/TRAINING PROGRAM

## 2022-12-02 NOTE — PROGRESS NOTES
Well Woman Visit    Chief Complaint   Patient presents with   • Establish Care           HPI  Janee Mensah is a 42 y.o. female, No obstetric history on file., who presents for annual well woman exam.       Ms. Mensah presents today to establish care as a new patient. She had previously been under the care of Dr. Guerrero, who was following  her as she had a history of abnormal Pap smears. He did perform a procedure, along with laser in some areas. He had been following up by obtaining Pap smears and colposcopies. She recalls that her most recent Pap smear that was obtained was normal. She does wish to have sexually transmitted disease testing along with her Pap smear today. The patient cannot recall the exact timeframe of when her cervical procedures were performed. The patient reports that she typically experiences issues with discharge and was on medication. She notes that she is doing relatively well currently. She does not have any current concerns for discharge today. The patient continues to menstruate, but states they are a bit irregular. She used to track her menstrual cycle, but she has not done so in quite some time. She denies having any concerns regarding heavy bleeding or issues with bowel and bladder movements. She is currently sexually active, but does not use contraception as she has had a tubal ligation. She denies having had any other previous abdominal surgeries. The patient has never had children. She is unable to provide family history of breast, uterine, or ovarian cancer, as she is not very familiar with her family history. The patient does have annual mammograms obtained, with her most recent one being in 2021. The patient is under the care of a primary care provider. She notes that she has seen her twice.     She was recently given doxycycline twice daily for 5 days, for a lung infection. She has had chest x-rays obtained on 11/21/2022 and 11/29/2022, as there were concerns for  her breathing. She does feel that her breathing has since improved. The patient currently uses an inhaler as needed for wheezing. She is a current smoker and reports that she smokes 1 pack daily. She has not discussed smoking cessation with her primary care provider. She states she has no desire to quit, as she is quite stressed at the moment.     Additional OB/GYN History   Current contraception: contraceptive methods: Tubal ligation  Desires to: continue contraception  Last Pap :   Last Completed Pap Smear     This patient has no relevant Health Maintenance data.        Result: Normal  History of abnormal Pap smear: yes - per patient  Last MMG :   Last Completed Mammogram     This patient has no relevant Health Maintenance data.         Last Colonoscopy :   Last Completed Colonoscopy     This patient has no relevant Health Maintenance data.            AllergiesPatient has no known allergies.   Family history of uterine, colon, breast, or ovarian cancer: unknown  Tobacco Usage?: Yes Janee Mensah  reports that she has been smoking cigarettes. She has a 30.00 pack-year smoking history. She has never used smokeless tobacco.. I have educated her on the risk of diseases from using tobacco products such as cancer, COPD and heart disease.     I advised her to quit and she is not willing to quit.    I spent 3  minutes counseling the patient.        OB History    No obstetric history on file.         The additional following portions of the patient's history were reviewed and updated as appropriate: allergies, current medications, past family history, past medical history, past social history, past surgical history and problem list.    Review of Systems   Constitutional: Negative for activity change, appetite change, fatigue and fever.   Eyes: Negative for blurred vision, photophobia and visual disturbance.   Respiratory: Negative for cough, chest tightness and shortness of breath.    Cardiovascular: Negative for  chest pain and palpitations.   Gastrointestinal: Negative for abdominal distention, abdominal pain, blood in stool, constipation, diarrhea, nausea and vomiting.   Genitourinary: Negative for dysuria and hematuria.   Musculoskeletal: Negative for arthralgias, back pain and joint swelling.   Skin: Negative for rash and wound.   Neurological: Negative for weakness, headache and confusion.       I have reviewed and agree with the HPI, ROS, and historical information as entered above. Dustin Garcia MD    Objective   /77   Pulse 94     Physical Exam  Vitals and nursing note reviewed. Exam conducted with a chaperone present.   Constitutional:       General: She is not in acute distress.     Appearance: Normal appearance. She is not toxic-appearing.   HENT:      Head: Normocephalic and atraumatic.   Eyes:      Extraocular Movements: Extraocular movements intact.      Conjunctiva/sclera: Conjunctivae normal.   Cardiovascular:      Pulses: Normal pulses.   Pulmonary:      Effort: Pulmonary effort is normal.   Abdominal:      General: There is no distension.      Palpations: Abdomen is soft.      Tenderness: There is no abdominal tenderness.   Genitourinary:     General: Normal vulva.      Exam position: Lithotomy position.      Labia:         Right: No tenderness, lesion or injury.         Left: No tenderness, lesion or injury.       Vagina: Normal.      Cervix: Normal.      Uterus: Normal.       Adnexa: Right adnexa normal and left adnexa normal.   Musculoskeletal:      Cervical back: Normal range of motion.   Skin:     General: Skin is warm and dry.   Neurological:      Mental Status: She is alert and oriented to person, place, and time.   Psychiatric:         Mood and Affect: Mood normal.         Behavior: Behavior normal.         Thought Content: Thought content normal.            Assessment and Plan    WWE    Diagnoses and all orders for this visit:    1. Well woman exam with routine gynecological exam  (Primary)  -     IgP, Aptima HPV; Future  -     Chlamydia trachomatis, Neisseria gonorrhoeae, PCR - Swab, Cervix; Future  -     Mammo Screening Digital Tomosynthesis Bilateral With CAD; Future  -     IgP, Aptima HPV  -     Chlamydia trachomatis, Neisseria gonorrhoeae, PCR - Swab, Cervix      The patient had a Pap smear collected in the office today and will be called with the results once they are obtained. She will receive an order for her annual mammogram. The patient was instructed to return to the clinic in 1 year.    Counseling:  Track menses, RTO IF <q21d, >7d long, or heavy   Smoking cessation    Preventative:  • MMG  • Pap smear      She understands the importance of having the above performed in a timely fashion.  The risks of not performing them include, but are not limited to, advanced cancer stages, bone loss from osteoporosis and/or subsequent increase in morbidity and/or mortality.  She is encouraged to review her results online and/or contact or office if she has questions.     Follow Up:  Return in about 1 year (around 12/2/2023) for Annual physical.        Dustin Garcia MD  12/02/2022     Transcribed from ambient dictation for Dustin Garcia MD by Magy Ibarra.  12/02/22   14:25 EST    Patient or patient representative verbalized consent to the visit recording.  I have personally performed the services described in this document as transcribed by the above individual, and it is both accurate and complete.

## 2022-12-13 LAB
CYTOLOGIST CVX/VAG CYTO: ABNORMAL
CYTOLOGY CVX/VAG DOC CYTO: ABNORMAL
CYTOLOGY CVX/VAG DOC THIN PREP: ABNORMAL
DX ICD CODE: ABNORMAL
DX ICD CODE: ABNORMAL
HIV 1 & 2 AB SER-IMP: ABNORMAL
HPV I/H RISK 4 DNA CVX QL PROBE+SIG AMP: POSITIVE
OTHER STN SPEC: ABNORMAL
PATHOLOGIST CVX/VAG CYTO: ABNORMAL
STAT OF ADQ CVX/VAG CYTO-IMP: ABNORMAL

## 2022-12-14 ENCOUNTER — TELEPHONE (OUTPATIENT)
Dept: OBSTETRICS AND GYNECOLOGY | Facility: CLINIC | Age: 42
End: 2022-12-14

## 2022-12-14 ENCOUNTER — TELEPHONE (OUTPATIENT)
Dept: INTERNAL MEDICINE | Facility: CLINIC | Age: 42
End: 2022-12-14

## 2022-12-14 NOTE — TELEPHONE ENCOUNTER
Caller: Janee Mensah    Relationship to patient: Self    Best call back number: [phone]      Patient is needing: PATIENT RETURNING MISSED CALL FROM OFFICE ABOUT PAP RESULTS. PATIENT IS AT WORK AND MISSED CALL.   HUB UNABLE TO WARM TRANSFER CALL.

## 2022-12-14 NOTE — TELEPHONE ENCOUNTER
CALLED PATIENT AND LEFT A VM TO CALL THE OFFICE TO RESCHEDULE APPT - PROVIDER IS OUT OF THE OFFICE.    OKAY FOR HUB TO ADVISE AND RESCHEDULE APPT FIRST AVAILABLE

## 2022-12-14 NOTE — TELEPHONE ENCOUNTER
----- Message from Dustin Garcia MD sent at 12/14/2022  9:51 AM EST -----  ASCUS/HPV+ recommendation for colposcopy. Please schedule

## 2022-12-14 NOTE — TELEPHONE ENCOUNTER
Patient tried calling back and HUB was unable to WT. I tried to call her back and left her a message.

## 2022-12-19 ENCOUNTER — TELEPHONE (OUTPATIENT)
Dept: OBSTETRICS AND GYNECOLOGY | Facility: CLINIC | Age: 42
End: 2022-12-19

## 2023-01-06 ENCOUNTER — OFFICE VISIT (OUTPATIENT)
Dept: OBSTETRICS AND GYNECOLOGY | Facility: CLINIC | Age: 43
End: 2023-01-06
Payer: MEDICAID

## 2023-01-06 VITALS
SYSTOLIC BLOOD PRESSURE: 109 MMHG | WEIGHT: 127 LBS | DIASTOLIC BLOOD PRESSURE: 75 MMHG | BODY MASS INDEX: 22.5 KG/M2 | HEART RATE: 74 BPM

## 2023-01-06 DIAGNOSIS — R87.810 ASCUS WITH POSITIVE HIGH RISK HPV CERVICAL: Primary | ICD-10-CM

## 2023-01-06 DIAGNOSIS — R30.0 DYSURIA: ICD-10-CM

## 2023-01-06 DIAGNOSIS — N30.00 ACUTE CYSTITIS WITHOUT HEMATURIA: ICD-10-CM

## 2023-01-06 DIAGNOSIS — R87.610 ASCUS WITH POSITIVE HIGH RISK HPV CERVICAL: Primary | ICD-10-CM

## 2023-01-06 LAB
CANDIDA SPECIES: NEGATIVE
GARDNERELLA VAGINALIS: NEGATIVE
T VAGINALIS DNA VAG QL PROBE+SIG AMP: NEGATIVE

## 2023-01-06 PROCEDURE — 87086 URINE CULTURE/COLONY COUNT: CPT | Performed by: STUDENT IN AN ORGANIZED HEALTH CARE EDUCATION/TRAINING PROGRAM

## 2023-01-06 PROCEDURE — 88305 TISSUE EXAM BY PATHOLOGIST: CPT | Performed by: STUDENT IN AN ORGANIZED HEALTH CARE EDUCATION/TRAINING PROGRAM

## 2023-01-06 PROCEDURE — 87480 CANDIDA DNA DIR PROBE: CPT | Performed by: STUDENT IN AN ORGANIZED HEALTH CARE EDUCATION/TRAINING PROGRAM

## 2023-01-06 PROCEDURE — 87088 URINE BACTERIA CULTURE: CPT | Performed by: STUDENT IN AN ORGANIZED HEALTH CARE EDUCATION/TRAINING PROGRAM

## 2023-01-06 PROCEDURE — 99213 OFFICE O/P EST LOW 20 MIN: CPT | Performed by: STUDENT IN AN ORGANIZED HEALTH CARE EDUCATION/TRAINING PROGRAM

## 2023-01-06 PROCEDURE — 87186 SC STD MICRODIL/AGAR DIL: CPT | Performed by: STUDENT IN AN ORGANIZED HEALTH CARE EDUCATION/TRAINING PROGRAM

## 2023-01-06 PROCEDURE — 87660 TRICHOMONAS VAGIN DIR PROBE: CPT | Performed by: STUDENT IN AN ORGANIZED HEALTH CARE EDUCATION/TRAINING PROGRAM

## 2023-01-06 PROCEDURE — 57454 BX/CURETT OF CERVIX W/SCOPE: CPT | Performed by: STUDENT IN AN ORGANIZED HEALTH CARE EDUCATION/TRAINING PROGRAM

## 2023-01-06 PROCEDURE — 87510 GARDNER VAG DNA DIR PROBE: CPT | Performed by: STUDENT IN AN ORGANIZED HEALTH CARE EDUCATION/TRAINING PROGRAM

## 2023-01-06 RX ORDER — PHENAZOPYRIDINE HYDROCHLORIDE 200 MG/1
200 TABLET, FILM COATED ORAL 3 TIMES DAILY PRN
Qty: 6 TABLET | Refills: 0 | Status: SHIPPED | OUTPATIENT
Start: 2023-01-06 | End: 2023-01-08

## 2023-01-06 NOTE — PROGRESS NOTES
Colposcopy    CC:  Pt presents for colposcopy  Chief Complaint   Patient presents with   • Colposcopy   • Dysuria       Social History     Substance and Sexual Activity   Sexual Activity Not on file       Tobacco/Nicotine use:  yes  Pap result: ASCUS, HPV HIGH RISK POSITIVE- non specified type  Uhcg:  Negative  Consent signed: yes    Procedure reviewed in detail.  She understands the potential risks include, but are not limited to, pain, bleeding, and infection.  Her questions have been answered.     Subjective/HPI:  Janee Mensah is a 42 y.o. No obstetric history on file.  Presenting for colposcopy for abnormal Pap smear.  Patient with history of cervical dysplasia.   Approximately 2 years ago underwent cold knife cone and laser ablation of the cervix with Dr. Guerrero in [place].  Has been reporting that she has been having difficulty with urination and is taking over-the-counter medication which she cannot recall the name of.  Concerned that her partner may have given her something.  She has not had new partner since she was tested for gonorrhea and chlamydia at her last appointment.         Objective:  /75   Pulse 74   Wt 57.6 kg (127 lb)   LMP  (LMP Unknown)   Breastfeeding No   BMI 22.50 kg/m²   External genitalia- Without lesion  Perineum- Without lesion  Vagina- Without lesion  Cervix- Adequate 100%TZ seen, AWL 7 o'clock, Biopsies taken at lesion site/s, ECC performed, Monsels for hemostasis, Hemostatic, Evidence of previous cold knife cone, not much cervical tissue remaining for future excisional procedure.  Physical Exam  Patient tolerated the procedure well.    Assessment and Plan:  Diagnoses and all orders for this visit:    1. ASCUS with positive high risk HPV cervical (Primary)  -     Tissue Pathology Exam    2. Dysuria  -     Gardnerella vaginalis, Trichomonas vaginalis, Candida albicans, DNA - Swab, Vagina; Future  -     phenazopyridine (Pyridium) 200 MG tablet; Take 1 tablet  by mouth 3 (Three) Times a Day As Needed for Bladder Spasms for up to 2 days.  Dispense: 6 tablet; Refill: 0  -     Urine Culture - Urine, Urine, Clean Catch        Counseling:    We discussed HPV in detail.  Incidence, transmission, course, remission, progression, types, cervical cancer, genital warts, monitoring, and importance of compliance were reviewed.  A HPV handout was provided if she desired, I recommend she quit smoking.  Options reviewed.  I recommend she FU w PCP to assist if unable to do on her own    She understands the need for continued follow up until she is cleared to return to routine screening pap smears.  She understands the importance of follow up and consequences with lack of follow up (i.e. potential for cervical cancer).  Follow up usually ranges every 6months - 12months.      PRECAUTIONS - It is common to have bright red spotting and dark discharge after today.  If she has had cervical biopsies, she is to avoid intercourse or tampons as directed for 7 days.  She can use OTC pain relievers prn.  She needs to return to office or ER (if after hours/weekends) if she has pelvic pain, bleeding > 1 pad/2 hours, discharge that has a bad vaginal odor or vaginal itching, fever > 101.5, or any other concerns.      Follow Up:  Return for We will call with results and next step.      Dustin Garcia MD  01/06/2023    [facility]  [address] [place] [address]  Dept: [phone]  Dept Fax: [phone]  Loc: [phone]  Loc Fax: [phone]

## 2023-01-07 RX ORDER — NITROFURANTOIN 25; 75 MG/1; MG/1
100 CAPSULE ORAL 2 TIMES DAILY
Qty: 14 CAPSULE | Refills: 0 | Status: SHIPPED | OUTPATIENT
Start: 2023-01-07 | End: 2023-03-02

## 2023-01-08 LAB — BACTERIA SPEC AEROBE CULT: ABNORMAL

## 2023-01-09 ENCOUNTER — TELEPHONE (OUTPATIENT)
Dept: OBSTETRICS AND GYNECOLOGY | Facility: CLINIC | Age: 43
End: 2023-01-09
Payer: MEDICAID

## 2023-01-10 LAB
CYTO UR: NORMAL
LAB AP CASE REPORT: NORMAL
LAB AP CLINICAL INFORMATION: NORMAL
PATH REPORT.FINAL DX SPEC: NORMAL
PATH REPORT.GROSS SPEC: NORMAL

## 2023-01-11 ENCOUNTER — TELEPHONE (OUTPATIENT)
Dept: OBSTETRICS AND GYNECOLOGY | Facility: CLINIC | Age: 43
End: 2023-01-11
Payer: MEDICAID

## 2023-01-11 NOTE — TELEPHONE ENCOUNTER
----- Message from Dustin Garcia MD sent at 1/11/2023  8:49 AM EST -----  Biopsy at colposcopy demonstrating low grade changes, CIN1; ECC normal  - recommendation for cotesting in 12 months per [facility]. Please notify patient.

## 2023-01-20 ENCOUNTER — OFFICE VISIT (OUTPATIENT)
Dept: INTERNAL MEDICINE | Facility: CLINIC | Age: 43
End: 2023-01-20
Payer: MEDICAID

## 2023-01-20 VITALS
HEART RATE: 86 BPM | SYSTOLIC BLOOD PRESSURE: 109 MMHG | BODY MASS INDEX: 22.77 KG/M2 | TEMPERATURE: 98.2 F | DIASTOLIC BLOOD PRESSURE: 75 MMHG | OXYGEN SATURATION: 97 % | WEIGHT: 128.5 LBS | HEIGHT: 63 IN

## 2023-01-20 DIAGNOSIS — F39 MOOD DISORDER: Chronic | ICD-10-CM

## 2023-01-20 DIAGNOSIS — F41.9 ANXIETY: Chronic | ICD-10-CM

## 2023-01-20 DIAGNOSIS — G47.00 INSOMNIA, UNSPECIFIED TYPE: Primary | Chronic | ICD-10-CM

## 2023-01-20 DIAGNOSIS — R05.3 CHRONIC COUGH: ICD-10-CM

## 2023-01-20 DIAGNOSIS — N89.8 VAGINAL DISCHARGE: ICD-10-CM

## 2023-01-20 PROCEDURE — 99214 OFFICE O/P EST MOD 30 MIN: CPT | Performed by: NURSE PRACTITIONER

## 2023-01-20 RX ORDER — BROMPHENIRAMINE MALEATE, PSEUDOEPHEDRINE HYDROCHLORIDE, AND DEXTROMETHORPHAN HYDROBROMIDE 2; 30; 10 MG/5ML; MG/5ML; MG/5ML
10 SYRUP ORAL 4 TIMES DAILY PRN
Qty: 400 ML | Refills: 0 | Status: SHIPPED | OUTPATIENT
Start: 2023-01-20 | End: 2023-01-20

## 2023-01-20 RX ORDER — TRAZODONE HYDROCHLORIDE 100 MG/1
100 TABLET ORAL
Qty: 90 TABLET | Refills: 1 | Status: SHIPPED | OUTPATIENT
Start: 2023-01-20 | End: 2023-03-02 | Stop reason: SDUPTHER

## 2023-01-20 NOTE — PROGRESS NOTES
Chief Complaint  Anxiety    Subjective          Janee Mensah presents to [facility]  Cough  This is a chronic problem. The current episode started more than 1 year ago. The problem has been waxing and waning. The problem occurs every few minutes. The cough is non-productive. Pertinent negatives include no chest pain, chills, ear congestion, ear pain, fever, headaches, heartburn, hemoptysis, myalgias, nasal congestion, postnasal drip, rash, rhinorrhea, sore throat, shortness of breath, sweats, weight loss or wheezing. She has tried OTC cough suppressant, oral steroids, OTC inhaler, prescription cough suppressant, rest, steroid inhaler, leukotriene antagonists, body position changes, cool air and a beta-agonist inhaler for the symptoms. The treatment provided mild relief.       Anxiety  Presents for follow-up visit. Symptoms include depressed mood, excessive worry, irritability, nervous/anxious behavior and panic. Patient reports no chest pain, compulsions, confusion, decreased concentration, dizziness, dry mouth, feeling of choking, hyperventilation, impotence, insomnia, malaise, muscle tension, nausea, obsessions, palpitations, restlessness, shortness of breath or suicidal ideas.     Risk factors:  passed away 6 years ago  Treatments tried: currently on hydroxyzine and lamictal    Insomnia  This is a chronic problem. Pertinent negatives include no abdominal pain, anorexia, arthralgias, change in bowel habit, chest pain, chills, congestion, coughing, diaphoresis, fatigue, fever, headaches, joint swelling, myalgias, nausea, neck pain, numbness, rash, sore throat, swollen glands, urinary symptoms, vertigo, visual change, vomiting or weakness. Treatments tried: trazodone  The treatment provided significant relief.   Vaginal Discharge  The patient's primary symptoms include a genital odor and vaginal discharge. The patient's pertinent negatives include no  "genital itching, genital lesions, genital rash, missed menses, pelvic pain or vaginal bleeding. This is a new problem. The current episode started 1 to 4 weeks ago. Pertinent negatives include no abdominal pain, anorexia, back pain, chills, constipation, diarrhea, discolored urine, dysuria, fever, flank pain, frequency, headaches, hematuria, joint pain, joint swelling, nausea, painful intercourse, rash, sore throat, urgency or vomiting. The vaginal discharge was white, yellow and thick. There has been no bleeding. She has not been passing clots. She has not been passing tissue. Nothing aggravates the symptoms.   Pt was + for trich and completed abx. Pt was seen by GYN for repeat and was negative. Patient did have + UTI at that time and is supposed to be taking macrobid but did not take because she was unsure what it was for. Reviewed gyn visit records and labs with patients and explained and she states that she will take the abx.   Current Outpatient Medications   Medication Instructions   • hydrOXYzine pamoate (VISTARIL) 25 MG capsule 1 capsule, Oral, 3 Times Daily PRN   • lamoTRIgine (LAMICTAL) 150 mg, Oral, Daily   • nitrofurantoin (macrocrystal-monohydrate) (MACROBID) 100 mg, Oral, 2 Times Daily   • traZODone (DESYREL) 100 mg, Oral, Every Night at Bedtime       The following portions of the patient's history were reviewed and updated as appropriate: allergies, current medications, past family history, past medical history, past social history, past surgical history, and problem list.    Objective   Vital Signs:   /75 (BP Location: Right arm, Patient Position: Sitting, Cuff Size: Adult)   Pulse 86   Temp 98.2 °F (36.8 °C) (Temporal)   Ht 160 cm (63\")   Wt 58.3 kg (128 lb 8 oz)   SpO2 97%   BMI 22.76 kg/m²     Wt Readings from Last 3 Encounters:   01/20/23 58.3 kg (128 lb 8 oz)   01/06/23 57.6 kg (127 lb)   11/04/22 57.6 kg (126 lb 15.8 oz)     BP Readings from Last 3 Encounters:   01/20/23 109/75 "   01/06/23 109/75   12/02/22 111/77     Physical Exam   Appearance: No acute distress, well-nourished  Head: normocephalic, atraumatic  Eyes: extraocular movements intact, no scleral icterus, no conjunctival injection  Ears, Nose, and Throat: external ears normal, nares patent, moist mucous membranes  Cardiovascular: regular rate and rhythm. no murmurs, rubs, or gallops. no edema  Respiratory: breathing comfortably, symmetric chest rise, clear to auscultation bilaterally. No wheezes, rales, or rhonchi.  Neuro: alert and oriented to time, place, and person. Normal gait  Psych: normal mood and affect     Result Review :   The following data was reviewed by: ANGY Limon on 01/20/2023:  Common labs    Common Labs 8/26/22 8/26/22 8/26/22    1625 1625 1625   Glucose   91   BUN   6   Creatinine   0.66   Sodium   137   Potassium   3.6   Chloride   101   Calcium   9.9   Albumin   4.50   Total Bilirubin   0.2   Alkaline Phosphatase   63   AST (SGOT)   19   ALT (SGPT)   11   WBC 10.99 (A)     Hemoglobin 14.8     Hematocrit 44.0     Platelets 387     Total Cholesterol  238 (A)    Triglycerides  146    HDL Cholesterol  50    LDL Cholesterol   162 (A)    (A) Abnormal value                   Lab Results   Component Value Date    COVID19 Detected (C) 01/22/2022    BILIRUBINUR Negative 10/28/2022       Procedures        Assessment and Plan    Diagnoses and all orders for this visit:    1. Insomnia, unspecified type (Primary)  Comments:  well controlle don current regimen   Orders:  -     traZODone (DESYREL) 100 MG tablet; Take 1 tablet by mouth every night at bedtime.  Dispense: 90 tablet; Refill: 1    2. Vaginal discharge  -     Cancel: Chlamydia trachomatis, Neisseria gonorrhoeae, Trichomonas vaginalis, PCR - Urine, Urine, Random Void    3. Chronic cough  -     Full Pulmonary Function Test With Bronchodilator; Future  -     Discontinue: brompheniramine-pseudoephedrine-DM (Bromfed DM) 30-2-10 MG/5ML syrup; Take 10 mL  by mouth 4 (Four) Times a Day As Needed for Congestion, Cough or Allergies for up to 10 days.  Dispense: 400 mL; Refill: 0    4. Anxiety  Comments:  well controlled on current regimen     5. Mood disorder (HCC)  Comments:  well controlled at this time.           Medications Discontinued During This Encounter   Medication Reason   • HYDROcodone-acetaminophen (NORCO) 5-325 MG per tablet    • brompheniramine-pseudoephedrine-DM (Bromfed DM) 30-2-10 MG/5ML syrup Historical Med - Therapy completed   • doxycycline (VIBRAMYCIN) 100 MG capsule Historical Med - Therapy completed   • albuterol sulfate  (90 Base) MCG/ACT inhaler Historical Med - Therapy completed   • methylPREDNISolone (MEDROL) 4 MG dose pack Historical Med - Therapy completed   • traZODone (DESYREL) 100 MG tablet Reorder          Follow Up   Return in about 3 months (around 4/20/2023) for Anxiety.  Patient was given instructions and counseling regarding her condition or for health maintenance advice. Please see specific information pulled into the AVS if appropriate.       Carlee Rush, APRN  01/20/23  15:27 EST

## 2023-01-23 NOTE — TELEPHONE ENCOUNTER
Left a message for the patient to discuss her colpo results. Letter sent to the patient to contact the office.

## 2023-02-24 ENCOUNTER — HOSPITAL ENCOUNTER (OUTPATIENT)
Dept: RESPIRATORY THERAPY | Facility: HOSPITAL | Age: 43
Discharge: HOME | End: 2023-02-24
Payer: MEDICAID

## 2023-02-28 ENCOUNTER — TELEPHONE (OUTPATIENT)
Dept: OBSTETRICS AND GYNECOLOGY | Facility: CLINIC | Age: 43
End: 2023-02-28
Payer: MEDICAID

## 2023-02-28 RX ORDER — CLINDAMYCIN PHOSPHATE 20 MG/G
1 CREAM VAGINAL NIGHTLY
Qty: 40 G | Refills: 0 | Status: CANCELLED | OUTPATIENT
Start: 2023-02-28

## 2023-03-02 ENCOUNTER — OFFICE VISIT (OUTPATIENT)
Dept: INTERNAL MEDICINE | Facility: CLINIC | Age: 43
End: 2023-03-02
Payer: MEDICAID

## 2023-03-02 VITALS
HEART RATE: 86 BPM | TEMPERATURE: 98.4 F | HEIGHT: 63 IN | BODY MASS INDEX: 22.43 KG/M2 | SYSTOLIC BLOOD PRESSURE: 113 MMHG | DIASTOLIC BLOOD PRESSURE: 76 MMHG | WEIGHT: 126.6 LBS | OXYGEN SATURATION: 98 %

## 2023-03-02 DIAGNOSIS — G47.00 INSOMNIA, UNSPECIFIED TYPE: Chronic | ICD-10-CM

## 2023-03-02 DIAGNOSIS — Z11.3 SCREENING FOR STD (SEXUALLY TRANSMITTED DISEASE): ICD-10-CM

## 2023-03-02 DIAGNOSIS — F39 MOOD DISORDER: ICD-10-CM

## 2023-03-02 DIAGNOSIS — Z13.220 SCREENING FOR LIPID DISORDERS: ICD-10-CM

## 2023-03-02 DIAGNOSIS — R31.9 HEMATURIA, UNSPECIFIED TYPE: ICD-10-CM

## 2023-03-02 DIAGNOSIS — N89.8 VAGINAL DISCHARGE: Primary | ICD-10-CM

## 2023-03-02 DIAGNOSIS — F41.9 ANXIETY: ICD-10-CM

## 2023-03-02 LAB
ALBUMIN SERPL-MCNC: 4.7 G/DL (ref 3.5–5.2)
ALBUMIN/GLOB SERPL: 2.1 G/DL
ALP SERPL-CCNC: 56 U/L (ref 39–117)
ALT SERPL W P-5'-P-CCNC: 18 U/L (ref 1–33)
ANION GAP SERPL CALCULATED.3IONS-SCNC: 10 MMOL/L (ref 5–15)
AST SERPL-CCNC: 19 U/L (ref 1–32)
BASOPHILS # BLD AUTO: 0.04 10*3/MM3 (ref 0–0.2)
BASOPHILS NFR BLD AUTO: 0.4 % (ref 0–1.5)
BILIRUB BLD-MCNC: NEGATIVE MG/DL
BILIRUB SERPL-MCNC: 0.2 MG/DL (ref 0–1.2)
BILIRUB UR QL STRIP: NEGATIVE
BUN SERPL-MCNC: 8 MG/DL (ref 6–20)
BUN/CREAT SERPL: 11.9 (ref 7–25)
C TRACH RRNA CVX QL NAA+PROBE: NOT DETECTED
CALCIUM SPEC-SCNC: 9.9 MG/DL (ref 8.6–10.5)
CANDIDA SPECIES: NEGATIVE
CHLORIDE SERPL-SCNC: 104 MMOL/L (ref 98–107)
CHOLEST SERPL-MCNC: 209 MG/DL (ref 0–200)
CLARITY UR: ABNORMAL
CLARITY, POC: CLEAR
CO2 SERPL-SCNC: 24 MMOL/L (ref 22–29)
COLOR UR: ABNORMAL
COLOR UR: YELLOW
CREAT SERPL-MCNC: 0.67 MG/DL (ref 0.57–1)
DEPRECATED RDW RBC AUTO: 40.7 FL (ref 37–54)
EGFRCR SERPLBLD CKD-EPI 2021: 110.7 ML/MIN/1.73
EOSINOPHIL # BLD AUTO: 0.02 10*3/MM3 (ref 0–0.4)
EOSINOPHIL NFR BLD AUTO: 0.2 % (ref 0.3–6.2)
ERYTHROCYTE [DISTWIDTH] IN BLOOD BY AUTOMATED COUNT: 12.6 % (ref 12.3–15.4)
EXPIRATION DATE: ABNORMAL
GARDNERELLA VAGINALIS: NEGATIVE
GLOBULIN UR ELPH-MCNC: 2.2 GM/DL
GLUCOSE SERPL-MCNC: 87 MG/DL (ref 65–99)
GLUCOSE UR STRIP-MCNC: NEGATIVE MG/DL
GLUCOSE UR STRIP-MCNC: NEGATIVE MG/DL
HAV IGM SERPL QL IA: NORMAL
HBV CORE IGM SERPL QL IA: NORMAL
HBV SURFACE AG SERPL QL IA: NORMAL
HCT VFR BLD AUTO: 41.4 % (ref 34–46.6)
HCV AB SER DONR QL: NORMAL
HDLC SERPL-MCNC: 52 MG/DL (ref 40–60)
HGB BLD-MCNC: 14.1 G/DL (ref 12–15.9)
HGB UR QL STRIP.AUTO: NEGATIVE
HIV1+2 AB SER QL: NORMAL
IMM GRANULOCYTES # BLD AUTO: 0.05 10*3/MM3 (ref 0–0.05)
IMM GRANULOCYTES NFR BLD AUTO: 0.5 % (ref 0–0.5)
KETONES UR QL STRIP: NEGATIVE
KETONES UR QL: NEGATIVE
LDLC SERPL CALC-MCNC: 146 MG/DL (ref 0–100)
LDLC/HDLC SERPL: 2.78 {RATIO}
LEUKOCYTE EST, POC: NEGATIVE
LEUKOCYTE ESTERASE UR QL STRIP.AUTO: ABNORMAL
LYMPHOCYTES # BLD AUTO: 3.29 10*3/MM3 (ref 0.7–3.1)
LYMPHOCYTES NFR BLD AUTO: 31.6 % (ref 19.6–45.3)
Lab: ABNORMAL
MCH RBC QN AUTO: 30.3 PG (ref 26.6–33)
MCHC RBC AUTO-ENTMCNC: 34.1 G/DL (ref 31.5–35.7)
MCV RBC AUTO: 88.8 FL (ref 79–97)
MONOCYTES # BLD AUTO: 0.52 10*3/MM3 (ref 0.1–0.9)
MONOCYTES NFR BLD AUTO: 5 % (ref 5–12)
N GONORRHOEA RRNA SPEC QL NAA+PROBE: NOT DETECTED
NEUTROPHILS NFR BLD AUTO: 6.5 10*3/MM3 (ref 1.7–7)
NEUTROPHILS NFR BLD AUTO: 62.3 % (ref 42.7–76)
NITRITE UR QL STRIP: NEGATIVE
NITRITE UR-MCNC: NEGATIVE MG/ML
NRBC BLD AUTO-RTO: 0 /100 WBC (ref 0–0.2)
PH UR STRIP.AUTO: 6 [PH] (ref 5–8)
PH UR: 6 [PH] (ref 5–8)
PLATELET # BLD AUTO: 395 10*3/MM3 (ref 140–450)
PMV BLD AUTO: 9.9 FL (ref 6–12)
POTASSIUM SERPL-SCNC: 3.3 MMOL/L (ref 3.5–5.2)
PROT SERPL-MCNC: 6.9 G/DL (ref 6–8.5)
PROT UR QL STRIP: NEGATIVE
PROT UR STRIP-MCNC: NEGATIVE MG/DL
RBC # BLD AUTO: 4.66 10*6/MM3 (ref 3.77–5.28)
RBC # UR STRIP: ABNORMAL /UL
SODIUM SERPL-SCNC: 138 MMOL/L (ref 136–145)
SP GR UR STRIP: 1.02 (ref 1–1.03)
SP GR UR: 1.02 (ref 1–1.03)
T VAGINALIS DNA VAG QL PROBE+SIG AMP: NEGATIVE
TRIGL SERPL-MCNC: 63 MG/DL (ref 0–150)
TSH SERPL DL<=0.05 MIU/L-ACNC: 0.65 UIU/ML (ref 0.27–4.2)
UROBILINOGEN UR QL STRIP: ABNORMAL
UROBILINOGEN UR QL: NORMAL
VLDLC SERPL-MCNC: 11 MG/DL (ref 5–40)
WBC NRBC COR # BLD: 10.42 10*3/MM3 (ref 3.4–10.8)

## 2023-03-02 PROCEDURE — 86592 SYPHILIS TEST NON-TREP QUAL: CPT | Performed by: NURSE PRACTITIONER

## 2023-03-02 PROCEDURE — 80053 COMPREHEN METABOLIC PANEL: CPT | Performed by: NURSE PRACTITIONER

## 2023-03-02 PROCEDURE — 87591 N.GONORRHOEAE DNA AMP PROB: CPT | Performed by: NURSE PRACTITIONER

## 2023-03-02 PROCEDURE — 87491 CHLMYD TRACH DNA AMP PROBE: CPT | Performed by: NURSE PRACTITIONER

## 2023-03-02 PROCEDURE — 87086 URINE CULTURE/COLONY COUNT: CPT | Performed by: NURSE PRACTITIONER

## 2023-03-02 PROCEDURE — 81001 URINALYSIS AUTO W/SCOPE: CPT | Performed by: NURSE PRACTITIONER

## 2023-03-02 PROCEDURE — G0432 EIA HIV-1/HIV-2 SCREEN: HCPCS | Performed by: NURSE PRACTITIONER

## 2023-03-02 PROCEDURE — 87186 SC STD MICRODIL/AGAR DIL: CPT | Performed by: NURSE PRACTITIONER

## 2023-03-02 PROCEDURE — 80074 ACUTE HEPATITIS PANEL: CPT | Performed by: NURSE PRACTITIONER

## 2023-03-02 PROCEDURE — 84443 ASSAY THYROID STIM HORMONE: CPT | Performed by: NURSE PRACTITIONER

## 2023-03-02 PROCEDURE — 99214 OFFICE O/P EST MOD 30 MIN: CPT | Performed by: NURSE PRACTITIONER

## 2023-03-02 PROCEDURE — 87510 GARDNER VAG DNA DIR PROBE: CPT | Performed by: NURSE PRACTITIONER

## 2023-03-02 PROCEDURE — 80061 LIPID PANEL: CPT | Performed by: NURSE PRACTITIONER

## 2023-03-02 PROCEDURE — 87480 CANDIDA DNA DIR PROBE: CPT | Performed by: NURSE PRACTITIONER

## 2023-03-02 PROCEDURE — 87660 TRICHOMONAS VAGIN DIR PROBE: CPT | Performed by: NURSE PRACTITIONER

## 2023-03-02 PROCEDURE — 85025 COMPLETE CBC W/AUTO DIFF WBC: CPT | Performed by: NURSE PRACTITIONER

## 2023-03-02 RX ORDER — TRAZODONE HYDROCHLORIDE 100 MG/1
100 TABLET ORAL
Qty: 90 TABLET | Refills: 1 | Status: SHIPPED | OUTPATIENT
Start: 2023-03-02

## 2023-03-02 NOTE — PROGRESS NOTES
Chief Complaint  Anxiety (Follow up )    Subjective          Janee Mensah presents to [facility]  History of Present Illness    Anxiety  Presents for follow up visit. Symptoms include depressed mood, excessive worry, irritability, nervous/anxious behavior and panic. Patient reports no chest pain, compulsions, confusion, decreased concentration, dizziness, dry mouth, feeling of choking, hyperventilation, impotence, insomnia, malaise, muscle tension, nausea, obsessions, palpitations, restlessness, shortness of breath or suicidal ideas.     Risk factors:  passed away 6 years ago  Treatments tried: currently on hydroxyzine and lamictal    Insomnia  This is a chronic problem. Pertinent negatives include no abdominal pain, anorexia, arthralgias, change in bowel habit, chest pain, chills, congestion, coughing, diaphoresis, fatigue, fever, headaches, joint swelling, myalgias, nausea, neck pain, numbness, rash, sore throat, swollen glands, urinary symptoms, vertigo, visual change, vomiting or weakness. Treatments tried: trazodone  The treatment provided significant relief.   Vaginal Discharge  The patient's primary symptoms include a genital odor and vaginal discharge. The patient's pertinent negatives include no genital itching, genital lesions, genital rash, missed menses, pelvic pain or vaginal bleeding. This is a new problem. The current episode started 1 to 4 weeks ago. Pertinent negatives include no abdominal pain, anorexia, back pain, chills, constipation, diarrhea, discolored urine, dysuria, fever, flank pain, frequency, headaches, hematuria, joint pain, joint swelling, nausea, painful intercourse, rash, sore throat, urgency or vomiting. The vaginal discharge was white, yellow and thick. There has been no bleeding. She has not been passing clots. She has not been passing tissue. Nothing aggravates the symptoms.     Current Outpatient Medications  "  Medication Instructions   • hydrOXYzine pamoate (VISTARIL) 25 MG capsule 1 capsule, Oral, 3 Times Daily PRN   • lamoTRIgine (LAMICTAL) 150 mg, Oral, Daily   • traZODone (DESYREL) 100 mg, Oral, Every Night at Bedtime       The following portions of the patient's history were reviewed and updated as appropriate: allergies, current medications, past family history, past medical history, past social history, past surgical history, and problem list.    Objective   Vital Signs:   /76 (BP Location: Left arm, Patient Position: Sitting, Cuff Size: Adult)   Pulse 86   Temp 98.4 °F (36.9 °C) (Temporal)   Ht 160 cm (63\")   Wt 57.4 kg (126 lb 9.6 oz)   SpO2 98%   BMI 22.43 kg/m²     Wt Readings from Last 3 Encounters:   03/02/23 57.4 kg (126 lb 9.6 oz)   01/20/23 58.3 kg (128 lb 8 oz)   01/06/23 57.6 kg (127 lb)     BP Readings from Last 3 Encounters:   03/02/23 113/76   01/20/23 109/75   01/06/23 109/75     Physical Exam   Appearance: No acute distress, well-nourished  Head: normocephalic, atraumatic  Eyes: extraocular movements intact, no scleral icterus, no conjunctival injection  Ears, Nose, and Throat: external ears normal, nares patent, moist mucous membranes  Cardiovascular: regular rate and rhythm. no murmurs, rubs, or gallops. no edema  Respiratory: breathing comfortably, symmetric chest rise, clear to auscultation bilaterally. No wheezes, rales, or rhonchi.  Neuro: alert and oriented to time, place, and person. Normal gait  Psych: normal mood and affect     Result Review :   The following data was reviewed by: ANGY Limon on 03/02/2023:  Common labs    Common Labs 8/26/22 8/26/22 8/26/22    1625 1625 1625   Glucose   91   BUN   6   Creatinine   0.66   Sodium   137   Potassium   3.6   Chloride   101   Calcium   9.9   Albumin   4.50   Total Bilirubin   0.2   Alkaline Phosphatase   63   AST (SGOT)   19   ALT (SGPT)   11   WBC 10.99 (A)     Hemoglobin 14.8     Hematocrit 44.0     Platelets 387   "   Total Cholesterol  238 (A)    Triglycerides  146    HDL Cholesterol  50    LDL Cholesterol   162 (A)    (A) Abnormal value                   Lab Results   Component Value Date    COVID19 Detected (C) 01/22/2022    BILIRUBINUR Negative 03/02/2023       Procedures        Assessment and Plan    Diagnoses and all orders for this visit:    1. Vaginal discharge (Primary)  -     Chlamydia trachomatis, Neisseria gonorrhoeae, PCR - , Urine, Random Void  -     Gardnerella vaginalis, Trichomonas vaginalis, Candida albicans, DNA - Swab, Vagina  -     POCT urinalysis dipstick, automated  -     Urinalysis With Microscopic - Urine, Clean Catch  -     Urine Culture - Urine, Urine, Clean Catch    2. Screening for lipid disorders  -     Lipid Panel    3. Mood disorder (HCC)    4. Anxiety  -     TSH Rfx On Abnormal To Free T4  -     Comprehensive Metabolic Panel  -     CBC & Differential    5. Screening for STD (sexually transmitted disease)  -     Hepatitis panel, acute  -     HIV-1/O/2 ANTIGEN/ANTIBODY, 4TH GENERATION  -     RPR    6. Hematuria, unspecified type  -     Urinalysis With Microscopic - Urine, Clean Catch  -     Urine Culture - Urine, Urine, Clean Catch    7. Insomnia, unspecified type  Comments:  well controlle don current regimen   Orders:  -     traZODone (DESYREL) 100 MG tablet; Take 1 tablet by mouth every night at bedtime.  Dispense: 90 tablet; Refill: 1          Medications Discontinued During This Encounter   Medication Reason   • nitrofurantoin, macrocrystal-monohydrate, (Macrobid) 100 MG capsule Historical Med - Therapy completed   • traZODone (DESYREL) 100 MG tablet Reorder          Follow Up   Return in about 6 months (around 9/2/2023).  Patient was given instructions and counseling regarding her condition or for health maintenance advice. Please see specific information pulled into the AVS if appropriate.       Carlee Rush, APRN  03/02/23  12:51 EST

## 2023-03-03 LAB
BACTERIA UR QL AUTO: ABNORMAL /HPF
HYALINE CASTS UR QL AUTO: ABNORMAL /LPF
RBC # UR STRIP: ABNORMAL /HPF
REF LAB TEST METHOD: ABNORMAL
RPR SER QL: NORMAL
SQUAMOUS #/AREA URNS HPF: ABNORMAL /HPF
TRI-PHOS CRY URNS QL MICRO: ABNORMAL /HPF
WBC # UR STRIP: ABNORMAL /HPF

## 2023-03-04 LAB — BACTERIA SPEC AEROBE CULT: ABNORMAL

## 2023-03-06 ENCOUNTER — TELEPHONE (OUTPATIENT)
Dept: INTERNAL MEDICINE | Facility: CLINIC | Age: 43
End: 2023-03-06
Payer: MEDICAID

## 2023-03-06 DIAGNOSIS — N39.0 ACUTE UTI (URINARY TRACT INFECTION): Primary | ICD-10-CM

## 2023-03-06 RX ORDER — NITROFURANTOIN 25; 75 MG/1; MG/1
100 CAPSULE ORAL 2 TIMES DAILY
Qty: 10 CAPSULE | Refills: 0 | Status: SHIPPED | OUTPATIENT
Start: 2023-03-06 | End: 2023-03-11

## 2023-03-06 NOTE — TELEPHONE ENCOUNTER
Left message for patient to return call to clinic.    HUB TO READ:  ----- Message from ANGY Limon sent at 3/6/2023  1:52 PM EST -----  Urine culture + for UTI. Sending in Macrobid.

## 2023-03-06 NOTE — TELEPHONE ENCOUNTER
----- Message from ANGY Limon sent at 3/6/2023  1:52 PM EST -----  Urine culture + for UTI. Sending in Macrobid.

## 2023-03-07 NOTE — TELEPHONE ENCOUNTER
Spoke with patient. Transfer from the HUB.     Patient made aware of results. Also made aware of her other lab results:  Cholesterol levels elevated. I recommend healthy diet and exercise to prevent the need for medications in the future.

## 2023-09-01 ENCOUNTER — OFFICE VISIT (OUTPATIENT)
Dept: INTERNAL MEDICINE | Facility: CLINIC | Age: 43
End: 2023-09-01

## 2023-09-01 VITALS
HEIGHT: 63 IN | WEIGHT: 118.25 LBS | TEMPERATURE: 98.2 F | HEART RATE: 70 BPM | OXYGEN SATURATION: 98 % | SYSTOLIC BLOOD PRESSURE: 144 MMHG | BODY MASS INDEX: 20.95 KG/M2 | DIASTOLIC BLOOD PRESSURE: 81 MMHG

## 2023-09-01 DIAGNOSIS — G47.00 INSOMNIA, UNSPECIFIED TYPE: Chronic | ICD-10-CM

## 2023-09-01 DIAGNOSIS — F41.9 ANXIETY: Primary | Chronic | ICD-10-CM

## 2023-09-01 DIAGNOSIS — F39 MOOD DISORDER: Chronic | ICD-10-CM

## 2023-09-01 DIAGNOSIS — E78.2 MIXED HYPERLIPIDEMIA: Chronic | ICD-10-CM

## 2023-09-01 DIAGNOSIS — Z23 ENCOUNTER FOR IMMUNIZATION: ICD-10-CM

## 2023-09-01 DIAGNOSIS — J06.9 UPPER RESPIRATORY TRACT INFECTION, UNSPECIFIED TYPE: ICD-10-CM

## 2023-09-01 DIAGNOSIS — R03.0 ELEVATED BLOOD PRESSURE READING WITHOUT DIAGNOSIS OF HYPERTENSION: ICD-10-CM

## 2023-09-01 PROCEDURE — 99214 OFFICE O/P EST MOD 30 MIN: CPT | Performed by: NURSE PRACTITIONER

## 2023-09-01 PROCEDURE — 1160F RVW MEDS BY RX/DR IN RCRD: CPT | Performed by: NURSE PRACTITIONER

## 2023-09-01 PROCEDURE — 1159F MED LIST DOCD IN RCRD: CPT | Performed by: NURSE PRACTITIONER

## 2023-09-01 RX ORDER — AZITHROMYCIN 250 MG/1
TABLET, FILM COATED ORAL
Qty: 6 TABLET | Refills: 0 | Status: SHIPPED | OUTPATIENT
Start: 2023-09-01

## 2023-09-01 NOTE — PROGRESS NOTES
Chief Complaint  Anxiety and Insomnia    Subjective          Janee Mensah presents to [facility]  Sinusitis  Associated symptoms include sinus pressure. Pertinent negatives include no congestion, coughing, diaphoresis, ear pain, headaches, hoarse voice, neck pain, shortness of breath, sneezing, sore throat or swollen glands. Treatments tried: Sudafed.       Anxiety  Presents for follow up visit. Symptoms include depressed mood, excessive worry, irritability, nervous/anxious behavior and panic. Patient reports no chest pain, compulsions, confusion, decreased concentration, dizziness, dry mouth, feeling of choking, hyperventilation, impotence, insomnia, malaise, muscle tension, nausea, obsessions, palpitations, restlessness, shortness of breath or suicidal ideas.     Risk factors:  passed away 6 years ago  Treatments tried: currently on hydroxyzine and lamictal    Insomnia  This is a chronic problem. Pertinent negatives include no abdominal pain, anorexia, arthralgias, change in bowel habit, chest pain, chills, congestion, coughing, diaphoresis, fatigue, fever, headaches, joint swelling, myalgias, nausea, neck pain, numbness, rash, sore throat, swollen glands, urinary symptoms, vertigo, visual change, vomiting or weakness. Treatments tried: trazodone  The treatment provided significant relief.   Current Outpatient Medications   Medication Instructions    azithromycin (Zithromax Z-Philip) 250 MG tablet Take 2 tablets by mouth on day 1, then 1 tablet daily on days 2-5    fluticasone (FLONASE) 50 MCG/ACT nasal spray 2 sprays, Nasal, Daily    hydrOXYzine pamoate (VISTARIL) 25 MG capsule 1 capsule, Oral, 3 Times Daily PRN    lamoTRIgine (LAMICTAL) 150 mg, Oral, Daily    meclizine (ANTIVERT) 25 mg, Oral, 3 Times Daily PRN    traZODone (DESYREL) 100 mg, Oral, Every Night at Bedtime       The following portions of the patient's history were reviewed and updated as  "appropriate: allergies, current medications, past family history, past medical history, past social history, past surgical history, and problem list.    Objective   Vital Signs:   /81 (BP Location: Left arm, Patient Position: Sitting, Cuff Size: Adult)   Pulse 70   Temp 98.2 øF (36.8 øC) (Temporal)   Ht 160 cm (63\")   Wt 53.6 kg (118 lb 4 oz)   SpO2 98%   BMI 20.95 kg/mý     BP Readings from Last 3 Encounters:   09/01/23 144/81   06/30/23 104/70   05/29/23 116/84     Wt Readings from Last 3 Encounters:   09/01/23 53.6 kg (118 lb 4 oz)   06/30/23 55.3 kg (122 lb)   05/29/23 54.4 kg (120 lb)     BMI is within normal parameters. No other follow-up for BMI required.    Physical Exam  HENT:      Nose:      Right Sinus: Maxillary sinus tenderness present.      Left Sinus: Maxillary sinus tenderness present.        Appearance: No acute distress, well-nourished  Head: normocephalic, atraumatic  Eyes: extraocular movements intact, no scleral icterus, no conjunctival injection  Ears, Nose, and Throat: external ears normal, nares patent, moist mucous membranes  Cardiovascular: regular rate and rhythm. no murmurs, rubs, or gallops. no edema  Respiratory: breathing comfortably, symmetric chest rise, clear to auscultation bilaterally. No wheezes, rales, or rhonchi.  Neuro: alert and oriented to time, place, and person. Normal gait  Psych: normal mood and affect     Result Review :   The following data was reviewed by: ANGY Limon on 09/01/2023:  Common labs          3/2/2023    11:35   Common Labs   Glucose 87    BUN 8    Creatinine 0.67    Sodium 138    Potassium 3.3    Chloride 104    Calcium 9.9    Albumin 4.7    Total Bilirubin 0.2    Alkaline Phosphatase 56    AST (SGOT) 19    ALT (SGPT) 18    WBC 10.42    Hemoglobin 14.1    Hematocrit 41.4    Platelets 395    Total Cholesterol 209    Triglycerides 63    HDL Cholesterol 52    LDL Cholesterol  146             Lab Results   Component Value Date    " COVID19 Detected (C) 01/22/2022    POCPREGUR Negative 05/29/2023    BILIRUBINUR Negative 05/29/2023       Procedures        Assessment and Plan    Diagnoses and all orders for this visit:    1. Anxiety (Primary)    2. Mood disorder    3. Insomnia, unspecified type    4. Elevated blood pressure reading without diagnosis of hypertension    5. Mixed hyperlipidemia  -     Lipid panel; Future    6. Encounter for immunization    7. Upper respiratory tract infection, unspecified type  -     azithromycin (Zithromax Z-Philip) 250 MG tablet; Take 2 tablets by mouth on day 1, then 1 tablet daily on days 2-5  Dispense: 6 tablet; Refill: 0          Medications Discontinued During This Encounter   Medication Reason    ofloxacin (FLOXIN) 0.3 % otic solution *Therapy completed    ondansetron ODT (ZOFRAN-ODT) 4 MG disintegrating tablet *Therapy completed          Follow Up   Return in about 5 months (around 2/1/2024).  Patient was given instructions and counseling regarding her condition or for health maintenance advice. Please see specific information pulled into the AVS if appropriate.       Carlee Rush, ANGY  09/01/23  13:17 EDT

## 2023-09-11 ENCOUNTER — TELEPHONE (OUTPATIENT)
Dept: INTERNAL MEDICINE | Facility: CLINIC | Age: 43
End: 2023-09-11

## 2023-09-11 NOTE — TELEPHONE ENCOUNTER
Caller: Janee Mensah    Relationship to patient: Self    Best call back number: [phone]    Chief complaint: URGENT CARE FOLLOW UP     Type of visit: OFFICE     Requested date: 9.12.23 OR 9.13.23     Additional notes: PATIENT SEEN AT URGENT CARE ON FRIDAY 9.8.23 AND WAS PRESCRIBED MEDICATION THAT LASTS UNTIL TOMORROW. PATIENT IS WANTING TO BE SEEN TO SEE IF SHE NEEDS TO HAVE ANYMORE MEDICATION PRESCRIBED. PATIENT DID NOT KNOW WHAT SHE WAS DIAGNOSED WITH OR WHAT MEDICATION WAS PRESCRIBED. PATIENT OFFERED FIRST AVAILABLE IN [facility] BUT DECLINED NEEDING EARLIER APPOINTMENT.

## 2023-09-12 ENCOUNTER — TELEPHONE (OUTPATIENT)
Dept: INTERNAL MEDICINE | Facility: CLINIC | Age: 43
End: 2023-09-12

## 2023-09-12 NOTE — TELEPHONE ENCOUNTER
Pt requesting f/u appt after urgent care visit.  Provider is requesting records of visit since outside .  Called pt to inquire about the location.  Left msg

## 2023-09-14 ENCOUNTER — TELEPHONE (OUTPATIENT)
Dept: INTERNAL MEDICINE | Facility: CLINIC | Age: 43
End: 2023-09-14

## 2023-09-19 NOTE — TELEPHONE ENCOUNTER
Patient was called on 9/12/23 and 9/14/23 to call the office we are needing to know what UC patient was seen at to get records to schedule appt.

## 2023-12-13 ENCOUNTER — OFFICE VISIT (OUTPATIENT)
Dept: OBSTETRICS AND GYNECOLOGY | Facility: CLINIC | Age: 43
End: 2023-12-13
Payer: MEDICAID

## 2023-12-13 VITALS
SYSTOLIC BLOOD PRESSURE: 118 MMHG | BODY MASS INDEX: 22.5 KG/M2 | HEIGHT: 63 IN | DIASTOLIC BLOOD PRESSURE: 74 MMHG | WEIGHT: 127 LBS

## 2023-12-13 DIAGNOSIS — Z01.419 WELL WOMAN EXAM WITH ROUTINE GYNECOLOGICAL EXAM: Primary | ICD-10-CM

## 2023-12-13 DIAGNOSIS — R87.610 ASCUS WITH POSITIVE HIGH RISK HPV CERVICAL: ICD-10-CM

## 2023-12-13 DIAGNOSIS — Z70.8 ENCOUNTER FOR SEXUALLY TRANSMITTED DISEASE COUNSELING: ICD-10-CM

## 2023-12-13 DIAGNOSIS — Z72.0 TOBACCO USE: ICD-10-CM

## 2023-12-13 DIAGNOSIS — R87.810 ASCUS WITH POSITIVE HIGH RISK HPV CERVICAL: ICD-10-CM

## 2023-12-13 LAB
C TRACH RRNA CVX QL NAA+PROBE: NOT DETECTED
CANDIDA SPECIES: NEGATIVE
GARDNERELLA VAGINALIS: NEGATIVE
HIV1+2 AB SER QL: NORMAL
N GONORRHOEA RRNA SPEC QL NAA+PROBE: NOT DETECTED
T VAGINALIS DNA VAG QL PROBE+SIG AMP: NEGATIVE

## 2023-12-13 PROCEDURE — 86592 SYPHILIS TEST NON-TREP QUAL: CPT | Performed by: STUDENT IN AN ORGANIZED HEALTH CARE EDUCATION/TRAINING PROGRAM

## 2023-12-13 PROCEDURE — 87491 CHLMYD TRACH DNA AMP PROBE: CPT | Performed by: STUDENT IN AN ORGANIZED HEALTH CARE EDUCATION/TRAINING PROGRAM

## 2023-12-13 PROCEDURE — G0432 EIA HIV-1/HIV-2 SCREEN: HCPCS | Performed by: STUDENT IN AN ORGANIZED HEALTH CARE EDUCATION/TRAINING PROGRAM

## 2023-12-13 PROCEDURE — 87480 CANDIDA DNA DIR PROBE: CPT | Performed by: STUDENT IN AN ORGANIZED HEALTH CARE EDUCATION/TRAINING PROGRAM

## 2023-12-13 PROCEDURE — 87660 TRICHOMONAS VAGIN DIR PROBE: CPT | Performed by: STUDENT IN AN ORGANIZED HEALTH CARE EDUCATION/TRAINING PROGRAM

## 2023-12-13 PROCEDURE — 87510 GARDNER VAG DNA DIR PROBE: CPT | Performed by: STUDENT IN AN ORGANIZED HEALTH CARE EDUCATION/TRAINING PROGRAM

## 2023-12-13 PROCEDURE — 87591 N.GONORRHOEAE DNA AMP PROB: CPT | Performed by: STUDENT IN AN ORGANIZED HEALTH CARE EDUCATION/TRAINING PROGRAM

## 2023-12-13 RX ORDER — OFLOXACIN 3 MG/ML
SOLUTION AURICULAR (OTIC)
COMMUNITY
Start: 2023-06-30

## 2023-12-13 NOTE — PATIENT INSTRUCTIONS
Venipuncture Blood Specimen Collection  Venipuncture performed in right arm by Fabiola Danielle with good hemostasis. Patient tolerated the procedure well without complications.   12/13/23   Fabiola Danielle

## 2023-12-13 NOTE — PROGRESS NOTES
Well Woman Visit    Chief Complaint   Patient presents with    Gynecologic Exam           HPI  Janee Mensah is a 43 y.o. female, , who presents for annual well woman exam. Patient's last menstrual period was 2023 (approximate)..  Periods are regular every 25-35 days, lasting 4-5 days. . Reports heavy bleeding 1-2 days and lightens up. She does not endores any pain with her menses. She is still smoking. She is not desiring to stop smoking. She smokes about 1 PPD. Does desire to be screened sexually for HIV and Syphilis. She has not had any new sexual partners since last being seen. She is sexually active. She does not know anything about her family. She has not had a mammogram this year.  She has not had a mammogram performed and does not desire to have a mammogram performed.  She reports no concerns for her breast.  She declines breast examination today in office.  She has not had a colonoscopy or screening for colon cancer.  She reports that she does not have any concerns for this.    Additional OB/GYN History   Current contraception: contraceptive methods: Tubal ligation  Desires to: continue contraception  Last Pap :  ASCUS/HR HPV +   Last Completed Pap Smear            Ordered - PAP SMEAR (Every 3 Years) Ordered on 2023  IgP, Aptima HPV                  Result: Abnormal  History of abnormal Pap smear: yes - had excisional procedure with Dr. Guerrero   Last MMG :   Last Completed Mammogram       This patient has no relevant Health Maintenance data.           Last Colonoscopy :   Last Completed Colonoscopy       This patient has no relevant Health Maintenance data.          Hx My[facility] intake? : Yes.     AllergiesPatient has no known allergies.   Family history of uterine, colon, breast, or ovarian cancer:  Patient does not know much of family history  Tobacco Usage?: Yes Janee Mensah  reports that she has been smoking cigarettes. She has a 30.00 pack-year  "smoking history. She has been exposed to tobacco smoke. She has never used smokeless tobacco.. I have educated her on the risk of diseases from using tobacco products such as cancer, COPD, and heart disease.     I advised her to quit and she is not willing to quit.    I spent 3  minutes counseling the patient.        OB History          0    Para   0    Term   0       0    AB   0    Living   0         SAB   0    IAB   0    Ectopic   0    Molar   0    Multiple   0    Live Births   0                The additional following portions of the patient's history were reviewed and updated as appropriate: allergies, current medications, past family history, past medical history, past social history, past surgical history, and problem list.    Review of Systems   Constitutional:  Negative for fatigue and fever.   Respiratory:  Negative for cough and shortness of breath.    Cardiovascular:  Negative for chest pain.   Gastrointestinal:  Negative for abdominal distention and abdominal pain.   Genitourinary:  Negative for breast discharge, breast lump, breast pain, difficulty urinating, dysuria, menstrual problem, pelvic pain, pelvic pressure, vaginal bleeding, vaginal discharge and vaginal pain.       I have reviewed and agree with the HPI, ROS, and historical information as entered above. Dustin Garcia MD    Objective   /74   Ht 160 cm (63\")   Wt 57.6 kg (127 lb)   LMP 2023 (Approximate)   BMI 22.50 kg/m²     Physical Exam  Vitals and nursing note reviewed. Exam conducted with a chaperone present.   Constitutional:       General: She is not in acute distress.     Appearance: Normal appearance. She is not toxic-appearing.   HENT:      Head: Normocephalic and atraumatic.   Eyes:      Extraocular Movements: Extraocular movements intact.      Conjunctiva/sclera: Conjunctivae normal.   Cardiovascular:      Pulses: Normal pulses.   Pulmonary:      Effort: Pulmonary effort is normal.   Abdominal:      " General: There is no distension.      Palpations: Abdomen is soft.      Tenderness: There is no abdominal tenderness.   Genitourinary:     General: Normal vulva.      Exam position: Lithotomy position.      Labia:         Right: No tenderness or lesion.         Left: No tenderness, lesion or injury.       Vagina: Normal.      Cervix: Friability present. No cervical motion tenderness, discharge, lesion, erythema, cervical bleeding or eversion.      Uterus: Normal.       Adnexa: Right adnexa normal and left adnexa normal.   Musculoskeletal:      Cervical back: Normal range of motion.   Skin:     General: Skin is warm and dry.   Neurological:      Mental Status: She is alert and oriented to person, place, and time.   Psychiatric:         Mood and Affect: Mood normal.         Behavior: Behavior normal.         Thought Content: Thought content normal.            Assessment and Plan  Janee Mensah is a 43 y.o.  presenting for annual visit.  Patient with history of abnormal Pap smear performed in 2022 with atypical squamous cells undetermined significance with high risk HPV positivity.  Underwent colposcopy in 2023 with JHOANA-1.  This is her 1 year follow-up with Pap smear with mandatory cotesting.  Strong encouragement for smoking cessation.  Patient has no desire to stop smoking at this point in time.  Also discussed with patient clinicians recommendations for cancer screenings including for breast and colon cancer.  Patient was willing for clinician to order mammogram which was placed today.  I highly encouraged patient to discuss cancer screenings with primary care.  Discussed with patient the risk of breast cancer at baseline is approximately 12-1/2%.  Patient desiring sexually-transmitted disease screening which was performed today.  Return to office in 1 year or as need be.    Review of careeverywhere from GYN/ONC     LMP 22, h/o BTL  Reports recent onset of white discharge. Unsure  "about duration. Reporting some vaginal pain. Concerned that it feels like the time she was diagnosed with \"cancer.\" Of note, patient has a remote history of CKC, has reportedly normal pap smears since 2012. No record able to located about when she had her CKC or who the provider was who performed it. In a note from 2018, patient was deemed appropriate to follow with a general Ob/Gyn. At this time, patient is requesting to transfer care to [facility], discussed that she can certainly find an Ob/Gyn in her town and we can send our records there.     WWE    Diagnoses and all orders for this visit:    1. Well woman exam with routine gynecological exam (Primary)  -     IgP, Aptima HPV  -     Mammo Screening Digital Tomosynthesis Bilateral With CAD; Future    2. ASCUS with positive high risk HPV cervical    3. Tobacco use  Overview:  12/2/2022 PPD; no desire to quit       4. Encounter for sexually transmitted disease counseling  -     Chlamydia trachomatis, Neisseria gonorrhoeae, PCR - Swab, Cervix; Future  -     Gardnerella vaginalis, Trichomonas vaginalis, Candida albicans, DNA - Swab, Vagina; Future  -     HIV-1 & HIV-2 Antibodies  -     RPR; Future  -     Chlamydia trachomatis, Neisseria gonorrhoeae, PCR - Swab, Cervix  -     Gardnerella vaginalis, Trichomonas vaginalis, Candida albicans, DNA - Swab, Vagina  -     RPR        Counseling:  Calcium/Vit D/PNV  Track menses, RTO IF <q21d, >7d long, or heavy  Smoking cessation     Preventative:  MMG  Follow up PCP/Specialist PMHx and Labs  PAP smear with mandatory cotesting        She understands the importance of having the above performed in a timely fashion.  The risks of not performing them include, but are not limited to, advanced cancer stages, bone loss from osteoporosis and/or subsequent increase in morbidity and/or mortality.  She is encouraged to review her results online and/or contact or office if she has questions.     Follow Up:  Return in about 1 year (around " 12/13/2024) for Annual physical.      Dustin Garcia MD  12/13/2023

## 2023-12-14 ENCOUNTER — TELEPHONE (OUTPATIENT)
Dept: OBSTETRICS AND GYNECOLOGY | Facility: CLINIC | Age: 43
End: 2023-12-14
Payer: MEDICAID

## 2023-12-14 LAB — RPR SER QL: NORMAL

## 2023-12-14 NOTE — TELEPHONE ENCOUNTER
----- Message from Dustin Garcia MD sent at 12/14/2023  8:32 AM EST -----  STI screening for syphilis, HIV, gonorrhea, chlamydia and trichomonas negative.  Please notify patient.

## 2023-12-18 LAB
CYTOLOGIST CVX/VAG CYTO: ABNORMAL
CYTOLOGY CVX/VAG DOC CYTO: ABNORMAL
CYTOLOGY CVX/VAG DOC THIN PREP: ABNORMAL
DX ICD CODE: ABNORMAL
HIV 1 & 2 AB SER-IMP: ABNORMAL
HPV I/H RISK 4 DNA CVX QL PROBE+SIG AMP: POSITIVE
OTHER STN SPEC: ABNORMAL
STAT OF ADQ CVX/VAG CYTO-IMP: ABNORMAL

## 2024-02-02 ENCOUNTER — OFFICE VISIT (OUTPATIENT)
Dept: INTERNAL MEDICINE | Facility: CLINIC | Age: 44
End: 2024-02-02
Payer: MEDICAID

## 2024-02-02 VITALS
OXYGEN SATURATION: 96 % | HEIGHT: 63 IN | TEMPERATURE: 98.6 F | WEIGHT: 124 LBS | HEART RATE: 132 BPM | BODY MASS INDEX: 21.97 KG/M2 | SYSTOLIC BLOOD PRESSURE: 103 MMHG | DIASTOLIC BLOOD PRESSURE: 72 MMHG

## 2024-02-02 DIAGNOSIS — N76.0 ACUTE VAGINITIS: ICD-10-CM

## 2024-02-02 DIAGNOSIS — F41.9 ANXIETY: ICD-10-CM

## 2024-02-02 DIAGNOSIS — F39 MOOD DISORDER: ICD-10-CM

## 2024-02-02 DIAGNOSIS — Z12.11 SCREENING FOR COLON CANCER: ICD-10-CM

## 2024-02-02 DIAGNOSIS — E78.2 MIXED HYPERLIPIDEMIA: Primary | ICD-10-CM

## 2024-02-02 DIAGNOSIS — Z72.0 TOBACCO USE: ICD-10-CM

## 2024-02-02 LAB
ALBUMIN SERPL-MCNC: 4.5 G/DL (ref 3.5–5.2)
ALBUMIN/GLOB SERPL: 1.8 G/DL
ALP SERPL-CCNC: 63 U/L (ref 39–117)
ALT SERPL W P-5'-P-CCNC: 18 U/L (ref 1–33)
ANION GAP SERPL CALCULATED.3IONS-SCNC: 11.3 MMOL/L (ref 5–15)
AST SERPL-CCNC: 16 U/L (ref 1–32)
BASOPHILS # BLD AUTO: 0.04 10*3/MM3 (ref 0–0.2)
BASOPHILS NFR BLD AUTO: 0.5 % (ref 0–1.5)
BILIRUB SERPL-MCNC: <0.2 MG/DL (ref 0–1.2)
BUN SERPL-MCNC: 9 MG/DL (ref 6–20)
BUN/CREAT SERPL: 12.3 (ref 7–25)
CALCIUM SPEC-SCNC: 9.7 MG/DL (ref 8.6–10.5)
CANDIDA SPECIES: NEGATIVE
CHLORIDE SERPL-SCNC: 102 MMOL/L (ref 98–107)
CHOLEST SERPL-MCNC: 236 MG/DL (ref 0–200)
CO2 SERPL-SCNC: 24.7 MMOL/L (ref 22–29)
CREAT SERPL-MCNC: 0.73 MG/DL (ref 0.57–1)
DEPRECATED RDW RBC AUTO: 39.4 FL (ref 37–54)
EGFRCR SERPLBLD CKD-EPI 2021: 103.5 ML/MIN/1.73
EOSINOPHIL # BLD AUTO: 0.07 10*3/MM3 (ref 0–0.4)
EOSINOPHIL NFR BLD AUTO: 0.8 % (ref 0.3–6.2)
ERYTHROCYTE [DISTWIDTH] IN BLOOD BY AUTOMATED COUNT: 11.6 % (ref 12.3–15.4)
GARDNERELLA VAGINALIS: NEGATIVE
GLOBULIN UR ELPH-MCNC: 2.5 GM/DL
GLUCOSE SERPL-MCNC: 73 MG/DL (ref 65–99)
HCT VFR BLD AUTO: 43.9 % (ref 34–46.6)
HDLC SERPL-MCNC: 54 MG/DL (ref 40–60)
HGB BLD-MCNC: 14.6 G/DL (ref 12–15.9)
IMM GRANULOCYTES # BLD AUTO: 0.03 10*3/MM3 (ref 0–0.05)
IMM GRANULOCYTES NFR BLD AUTO: 0.4 % (ref 0–0.5)
LDLC SERPL CALC-MCNC: 164 MG/DL (ref 0–100)
LDLC/HDLC SERPL: 3 {RATIO}
LYMPHOCYTES # BLD AUTO: 3.36 10*3/MM3 (ref 0.7–3.1)
LYMPHOCYTES NFR BLD AUTO: 40.1 % (ref 19.6–45.3)
MCH RBC QN AUTO: 30.9 PG (ref 26.6–33)
MCHC RBC AUTO-ENTMCNC: 33.3 G/DL (ref 31.5–35.7)
MCV RBC AUTO: 93 FL (ref 79–97)
MONOCYTES # BLD AUTO: 0.46 10*3/MM3 (ref 0.1–0.9)
MONOCYTES NFR BLD AUTO: 5.5 % (ref 5–12)
NEUTROPHILS NFR BLD AUTO: 4.42 10*3/MM3 (ref 1.7–7)
NEUTROPHILS NFR BLD AUTO: 52.7 % (ref 42.7–76)
NRBC BLD AUTO-RTO: 0 /100 WBC (ref 0–0.2)
PLATELET # BLD AUTO: 386 10*3/MM3 (ref 140–450)
PMV BLD AUTO: 9.7 FL (ref 6–12)
POTASSIUM SERPL-SCNC: 4.3 MMOL/L (ref 3.5–5.2)
PROT SERPL-MCNC: 7 G/DL (ref 6–8.5)
RBC # BLD AUTO: 4.72 10*6/MM3 (ref 3.77–5.28)
SODIUM SERPL-SCNC: 138 MMOL/L (ref 136–145)
T VAGINALIS DNA VAG QL PROBE+SIG AMP: NEGATIVE
TRIGL SERPL-MCNC: 100 MG/DL (ref 0–150)
VLDLC SERPL-MCNC: 18 MG/DL (ref 5–40)
WBC NRBC COR # BLD AUTO: 8.38 10*3/MM3 (ref 3.4–10.8)

## 2024-02-02 PROCEDURE — 85025 COMPLETE CBC W/AUTO DIFF WBC: CPT | Performed by: NURSE PRACTITIONER

## 2024-02-02 PROCEDURE — 80053 COMPREHEN METABOLIC PANEL: CPT | Performed by: NURSE PRACTITIONER

## 2024-02-02 PROCEDURE — 87660 TRICHOMONAS VAGIN DIR PROBE: CPT | Performed by: NURSE PRACTITIONER

## 2024-02-02 PROCEDURE — 87480 CANDIDA DNA DIR PROBE: CPT | Performed by: NURSE PRACTITIONER

## 2024-02-02 PROCEDURE — 87510 GARDNER VAG DNA DIR PROBE: CPT | Performed by: NURSE PRACTITIONER

## 2024-02-02 PROCEDURE — 80061 LIPID PANEL: CPT | Performed by: NURSE PRACTITIONER

## 2024-02-02 RX ORDER — NICOTINE 21-14-7MG
1 KIT TRANSDERMAL TAKE AS DIRECTED
Qty: 56 EACH | Refills: 0 | Status: SHIPPED | OUTPATIENT
Start: 2024-02-02

## 2024-02-02 NOTE — PROGRESS NOTES
"Chief Complaint  Anxiety (Follow up )    Subjective          Janee Mensah is a 43 year old female that presents to [facility] for follow up visit.   She states she has had some brown vaginal discharge and odor for the last few days.   She denies any new sexual partners or concerns for stds  Menstrual cycles last 2-3 days and she has another about a week later. She states that she is snappy with her mood and gets mad easily. Sweating off and on.     She is interested in smoking cessation. She currently smokes 1ppd.         History of Present Illness      Current Outpatient Medications   Medication Instructions    lamoTRIgine (LAMICTAL) 150 mg, Oral, Daily    Nicotine 21-14-7 MG/24HR kit 1 kit, Transdermal, Take As Directed    ofloxacin (FLOXIN) 0.3 % otic solution PLACE 5 DROPS INTO THE LEFT EAR DAILY    traZODone (DESYREL) 100 mg, Oral, Every Night at Bedtime       The following portions of the patient's history were reviewed and updated as appropriate: allergies, current medications, past family history, past medical history, past social history, past surgical history, and problem list.    Objective   Vital Signs:   /72 (BP Location: Right arm, Patient Position: Sitting, Cuff Size: Adult)   Pulse (!) 132   Temp 98.6 °F (37 °C) (Temporal)   Ht 160 cm (63\")   Wt 56.2 kg (124 lb)   SpO2 96%   BMI 21.97 kg/m²     BP Readings from Last 3 Encounters:   02/02/24 103/72   12/13/23 118/74   09/01/23 144/81     Wt Readings from Last 3 Encounters:   02/02/24 56.2 kg (124 lb)   12/13/23 57.6 kg (127 lb)   09/01/23 53.6 kg (118 lb 4 oz)     BMI is within normal parameters. No other follow-up for BMI required.    Physical Exam  Vitals and nursing note reviewed.   Constitutional:       Appearance: Normal appearance. She is not ill-appearing.   HENT:      Head: Normocephalic and atraumatic.      Right Ear: External ear normal.      Left Ear: External ear normal. "      Nose: Nose normal.   Eyes:      Conjunctiva/sclera: Conjunctivae normal.      Pupils: Pupils are equal, round, and reactive to light.   Cardiovascular:      Rate and Rhythm: Normal rate and regular rhythm.      Heart sounds: Normal heart sounds.   Pulmonary:      Effort: Pulmonary effort is normal.      Breath sounds: Normal breath sounds.   Skin:     General: Skin is warm and dry.   Neurological:      Mental Status: She is alert and oriented to person, place, and time.   Psychiatric:         Attention and Perception: Attention and perception normal.         Mood and Affect: Mood is anxious.         Speech: Speech normal.         Behavior: Behavior normal.            Result Review :   The following data was reviewed by: ANGY Seaman on 02/02/2024:  Common labs          3/2/2023    11:35   Common Labs   Glucose 87    BUN 8    Creatinine 0.67    Sodium 138    Potassium 3.3    Chloride 104    Calcium 9.9    Albumin 4.7    Total Bilirubin 0.2    Alkaline Phosphatase 56    AST (SGOT) 19    ALT (SGPT) 18    WBC 10.42    Hemoglobin 14.1    Hematocrit 41.4    Platelets 395    Total Cholesterol 209    Triglycerides 63    HDL Cholesterol 52    LDL Cholesterol  146             Lab Results   Component Value Date    COVID19 Detected (C) 01/22/2022    POCPREGUR Negative 05/29/2023    BILIRUBINUR Negative 05/29/2023     Janee Mensah  reports that she has been smoking cigarettes. She has a 30.00 pack-year smoking history. She has been exposed to tobacco smoke. She has never used smokeless tobacco.. I have educated her on the risk of diseases from using tobacco products such as cancer, COPD, and heart disease.     I advised her to quit and she is willing to quit. We have discussed the following method/s for tobacco cessation:  OTC Cessation Products.      I spent 5 minutes counseling the patient.         Procedures        Assessment and Plan    Diagnoses and all orders for this visit:    1. Mixed  hyperlipidemia (Primary)  Comments:  check labs  Orders:  -     CBC w AUTO Differential; Future  -     Comprehensive metabolic panel; Future  -     Lipid panel; Future  -     CBC w AUTO Differential  -     Comprehensive metabolic panel  -     Lipid panel    2. Acute vaginitis  Comments:  send off self vaginal screen  Orders:  -     Gardnerella vaginalis, Trichomonas vaginalis, Candida albicans, DNA - Swab, Vagina    3. Anxiety  Comments:  stable    4. Mood disorder  Comments:  stable    5. Tobacco use  Comments:  will trial nicotine patches.  Orders:  -     Nicotine 21-14-7 MG/24HR kit; Place 1 kit on the skin as directed by provider Take As Directed.  Dispense: 56 each; Refill: 0    6. Screening for colon cancer  -     Ambulatory Referral For Screening Colonoscopy          There are no discontinued medications.       Follow Up   Return in about 6 months (around 8/2/2024) for Next scheduled follow up.  Patient was given instructions and counseling regarding her condition or for health maintenance advice. Please see specific information pulled into the AVS if appropriate.       Yoselin Burroughs, APRN  02/02/24  21:38 EST

## 2024-02-07 ENCOUNTER — TELEPHONE (OUTPATIENT)
Dept: INTERNAL MEDICINE | Facility: CLINIC | Age: 44
End: 2024-02-07
Payer: MEDICAID

## 2024-02-07 NOTE — TELEPHONE ENCOUNTER
Pharmacy called asking if it was okay to give the individual patches because they don't have the whole kit. I let them know this was fine.

## 2024-02-14 ENCOUNTER — OFFICE VISIT (OUTPATIENT)
Dept: INTERNAL MEDICINE | Facility: CLINIC | Age: 44
End: 2024-02-14
Payer: MEDICAID

## 2024-02-14 VITALS
SYSTOLIC BLOOD PRESSURE: 104 MMHG | DIASTOLIC BLOOD PRESSURE: 72 MMHG | HEIGHT: 63 IN | BODY MASS INDEX: 22.21 KG/M2 | TEMPERATURE: 97.7 F | OXYGEN SATURATION: 96 % | WEIGHT: 125.38 LBS | HEART RATE: 94 BPM

## 2024-02-14 DIAGNOSIS — J06.9 UPPER RESPIRATORY TRACT INFECTION, UNSPECIFIED TYPE: ICD-10-CM

## 2024-02-14 DIAGNOSIS — H65.02 NON-RECURRENT ACUTE SEROUS OTITIS MEDIA OF LEFT EAR: Primary | ICD-10-CM

## 2024-02-14 PROCEDURE — 1160F RVW MEDS BY RX/DR IN RCRD: CPT | Performed by: NURSE PRACTITIONER

## 2024-02-14 PROCEDURE — 1159F MED LIST DOCD IN RCRD: CPT | Performed by: NURSE PRACTITIONER

## 2024-02-14 PROCEDURE — 99213 OFFICE O/P EST LOW 20 MIN: CPT | Performed by: NURSE PRACTITIONER

## 2024-02-14 RX ORDER — PSEUDOEPHEDRINE HYDROCHLORIDE 60 MG/1
60 TABLET, FILM COATED ORAL EVERY 4 HOURS PRN
Qty: 20 TABLET | Refills: 0 | Status: CANCELLED | OUTPATIENT
Start: 2024-02-14

## 2024-02-14 RX ORDER — AMOXICILLIN AND CLAVULANATE POTASSIUM 875; 125 MG/1; MG/1
1 TABLET, FILM COATED ORAL 2 TIMES DAILY
Qty: 20 TABLET | Refills: 0 | Status: SHIPPED | OUTPATIENT
Start: 2024-02-14 | End: 2024-02-24

## 2024-02-14 RX ORDER — PSEUDOEPHEDRINE HCL 120 MG/1
120 TABLET, FILM COATED, EXTENDED RELEASE ORAL EVERY 12 HOURS
Qty: 20 TABLET | Refills: 0 | Status: SHIPPED | OUTPATIENT
Start: 2024-02-14

## 2024-03-11 ENCOUNTER — TELEPHONE (OUTPATIENT)
Dept: INTERNAL MEDICINE | Facility: CLINIC | Age: 44
End: 2024-03-11
Payer: MEDICAID

## 2024-03-11 NOTE — TELEPHONE ENCOUNTER
Spoke with patient. Verified .   Made aware patient needs an appointment. Scheduled for tomorrow at 9:30 am.

## 2024-03-11 NOTE — TELEPHONE ENCOUNTER
Caller: Janee Mensah    Relationship: Self    Best call back number: [phone]     What medication are you requesting: ANTIBIOTIC    What are your current symptoms: CHEST CONGESTION, COUGH    How long have you been experiencing symptoms: A WEEK    Have you had these symptoms before:    [x] Yes  [] No    Have you been treated for these symptoms before:   [x] Yes  [] No    If a prescription is needed, what is your preferred pharmacy and phone number: [id number] - [place], [place] - [address] AT [facility] [phone] [facility] [phone] FX

## 2024-03-12 ENCOUNTER — OFFICE VISIT (OUTPATIENT)
Dept: INTERNAL MEDICINE | Facility: CLINIC | Age: 44
End: 2024-03-12
Payer: MEDICAID

## 2024-03-12 VITALS
HEIGHT: 63 IN | TEMPERATURE: 97.6 F | OXYGEN SATURATION: 98 % | HEART RATE: 96 BPM | SYSTOLIC BLOOD PRESSURE: 91 MMHG | BODY MASS INDEX: 23.21 KG/M2 | DIASTOLIC BLOOD PRESSURE: 66 MMHG | WEIGHT: 131 LBS

## 2024-03-12 DIAGNOSIS — J06.9 VIRAL URI WITH COUGH: Primary | ICD-10-CM

## 2024-03-12 PROCEDURE — 1159F MED LIST DOCD IN RCRD: CPT | Performed by: NURSE PRACTITIONER

## 2024-03-12 PROCEDURE — 99213 OFFICE O/P EST LOW 20 MIN: CPT | Performed by: NURSE PRACTITIONER

## 2024-03-12 PROCEDURE — 1160F RVW MEDS BY RX/DR IN RCRD: CPT | Performed by: NURSE PRACTITIONER

## 2024-03-12 RX ORDER — GUAIFENESIN AND DEXTROMETHORPHAN HYDROBROMIDE 600; 30 MG/1; MG/1
2 TABLET, EXTENDED RELEASE ORAL 2 TIMES DAILY PRN
Qty: 20 TABLET | Refills: 0 | Status: SHIPPED | OUTPATIENT
Start: 2024-03-12

## 2024-03-12 RX ORDER — ALBUTEROL SULFATE 90 UG/1
2 AEROSOL, METERED RESPIRATORY (INHALATION) EVERY 4 HOURS PRN
Qty: 18 G | Refills: 0 | Status: SHIPPED | OUTPATIENT
Start: 2024-03-12

## 2024-03-12 NOTE — PROGRESS NOTES
"Chief Complaint  Nasal Congestion and Cough (Patient reports she has not felt better since her last visit. )    Subjective      Janee Mensah is a 43 year old female that  presents to [facility] with c/o cough and sinus congestion that has been going on for about one week. She states her voice has been going in and out. She admits to some mild wheezing. She denies fever, body aches or chills. She has been taking tylenol for headaches.   No known sick contacts or exposures.     History of Present Illness    Current Outpatient Medications   Medication Instructions    albuterol sulfate  (90 Base) MCG/ACT inhaler 2 puffs, Inhalation, Every 4 Hours PRN    guaifenesin-dextromethorphan 600-30 mg (MUCINEX DM)  MG tablet sustained-release 12 hour 2 tablets, Oral, 2 Times Daily PRN    lamoTRIgine (LAMICTAL) 150 mg, Oral, Daily    Nicotine 21-14-7 MG/24HR kit 1 kit, Transdermal, Take As Directed    pseudoephedrine (SUDAFED) 120 mg, Oral, Every 12 Hours    traZODone (DESYREL) 100 mg, Oral, Every Night at Bedtime       The following portions of the patient's history were reviewed and updated as appropriate: allergies, current medications, past family history, past medical history, past social history, past surgical history, and problem list.    Objective   Vital Signs:   BP 91/66 (BP Location: Left arm, Patient Position: Sitting, Cuff Size: Adult)   Pulse 96   Temp 97.6 °F (36.4 °C) (Temporal)   Ht 160 cm (63\")   Wt 59.4 kg (131 lb)   SpO2 98%   BMI 23.21 kg/m²     Wt Readings from Last 3 Encounters:   03/12/24 59.4 kg (131 lb)   02/14/24 56.9 kg (125 lb 6 oz)   02/02/24 56.2 kg (124 lb)     BP Readings from Last 3 Encounters:   03/12/24 91/66   02/14/24 104/72   02/02/24 103/72     Physical Exam  Vitals and nursing note reviewed.   Constitutional:       Appearance: Normal appearance. She is not ill-appearing.   HENT:      Head: Normocephalic and " atraumatic.   Eyes:      Conjunctiva/sclera: Conjunctivae normal.      Pupils: Pupils are equal, round, and reactive to light.   Cardiovascular:      Rate and Rhythm: Normal rate and regular rhythm.      Heart sounds: Normal heart sounds.   Pulmonary:      Effort: Pulmonary effort is normal.      Breath sounds: Normal breath sounds. No wheezing or rhonchi.   Skin:     General: Skin is warm and dry.   Neurological:      Mental Status: She is alert and oriented to person, place, and time.   Psychiatric:         Mood and Affect: Mood normal.         Behavior: Behavior normal.          Result Review :  The following data was reviewed by: ANGY Seaman on 03/12/2024:      Common labs          2/2/2024    14:45   Common Labs   Glucose 73    BUN 9    Creatinine 0.73    Sodium 138    Potassium 4.3    Chloride 102    Calcium 9.7    Albumin 4.5    Total Bilirubin <0.2    Alkaline Phosphatase 63    AST (SGOT) 16    ALT (SGPT) 18    WBC 8.38    Hemoglobin 14.6    Hematocrit 43.9    Platelets 386    Total Cholesterol 236    Triglycerides 100    HDL Cholesterol 54    LDL Cholesterol  164        Lab Results (last 72 hours)       ** No results found for the last 72 hours. **             No Images in the past 120 days found..    Lab Results   Component Value Date    COVID19 Detected (C) 01/22/2022    POCPREGUR Negative 05/29/2023    BILIRUBINUR Negative 05/29/2023       Procedures        Assessment and Plan   Diagnoses and all orders for this visit:    1. Viral URI with cough (Primary)  -     albuterol sulfate  (90 Base) MCG/ACT inhaler; Inhale 2 puffs Every 4 (Four) Hours As Needed for Wheezing.  Dispense: 18 g; Refill: 0  -     guaifenesin-dextromethorphan 600-30 mg (MUCINEX DM)  MG tablet sustained-release 12 hour; Take 2 tablets by mouth 2 (Two) Times a Day As Needed (cough/congestion).  Dispense: 20 tablet; Refill: 0      Refused poc testing.    BMI is within normal parameters. No other follow-up for  BMI required.         Medications Discontinued During This Encounter   Medication Reason    ofloxacin (FLOXIN) 0.3 % otic solution *Therapy completed          Follow Up   No follow-ups on file.  Patient was given instructions and counseling regarding her condition or for health maintenance advice. Please see specific information pulled into the AVS if appropriate.       Yoselin Burroughs, ANGY  03/12/24  11:32 EDT

## 2024-03-20 NOTE — PROGRESS NOTES
"GYN Problem/Follow Up Visit    Chief Complaint   Patient presents with    Follow-up     Pt hasn't had period since 2024            HPI  Janee Mensah is a 43 y.o. female, , who presents for menstrual changes, experienced 2 menses in January, lasting about 4 days each, moderate flow, no menses since. Had been under enormous stress for the past few months.        Desires STI screen, new partner. Discharge color has changed, now yellow. No odor.     Patient reports that she is not currently experiencing any symptoms of urinary incontinence.     CBC w AUTO Differential (2024 14:45)    TSH Rfx On Abnormal To Free T4 (2023 11:35)    Additional OB/GYN History   Patient's last menstrual period was 2024 (approximate).  Current contraception: contraceptive methods: Tubal ligation    Past Medical History:   Diagnosis Date    Abnormal Pap smear of cervix     Anxiety and depression     Cervical cancer, FIGO stage I     HPV (human papilloma virus) infection       Past Surgical History:   Procedure Laterality Date    LAPAROSCOPIC TUBAL LIGATION      does not recall the year.      Family History   Problem Relation Age of Onset    Breast cancer Neg Hx     Ovarian cancer Neg Hx     Uterine cancer Neg Hx     Prostate cancer Neg Hx     Colon cancer Neg Hx      Allergies as of 2024    (No Known Allergies)      The additional following portions of the patient's history were reviewed and updated as appropriate: allergies, current medications, past family history, past medical history, past social history, past surgical history, and problem list.    Review of Systems    See HPI for pertinent ROS    Objective   /73   Pulse 91   Ht 160 cm (63\")   Wt 57.6 kg (127 lb)   LMP 2024 (Approximate)   BMI 22.50 kg/m²     Physical Exam  Vitals and nursing note reviewed. Exam conducted with a chaperone present.   Constitutional:       Appearance: Normal appearance.   Cardiovascular:      Rate " and Rhythm: Normal rate.   Pulmonary:      Effort: Pulmonary effort is normal.   Genitourinary:     General: Normal vulva.      Vagina: Normal.      Cervix: Normal.      Uterus: Normal.       Adnexa: Right adnexa normal and left adnexa normal.   Lymphadenopathy:      Lower Body: No right inguinal adenopathy. No left inguinal adenopathy.   Skin:     General: Skin is warm and dry.   Neurological:      Mental Status: She is alert and oriented to person, place, and time.          Assessment and Plan    Diagnoses and all orders for this visit:    1. Irregular menstrual bleeding (Primary)  -     POC Pregnancy, Urine  -     TSH  -     Prolactin    2. Screen for STD (sexually transmitted disease)  -     RPR, Rfx Qn RPR / Confirm TP  -     Hepatitis B Surface Antigen  -     Hepatitis C Antibody  -     HIV-1 / O / 2 Ag / Antibody 4th Generation  -     Chlamydia trachomatis, Neisseria gonorrhoeae, PCR - Swab, Cervix  -     Gardnerella vaginalis, Trichomonas vaginalis, Candida albicans, DNA - Swab, Vagina      Counseling:  All treatment options with regard to pt hx NLT hormonal, surgical, expectant R/B/A/SE/E  Desires expectant management at this time, was under significant stress for past few months, follow up if symptoms persist or worsen. Counseled regarding perimenopausal bleeding changes.   Smoking cessation  Safe sex  HCG, Urine, QL   Date Value Ref Range Status   03/25/2024 Negative Negative Final         Follow Up:  Return if symptoms worsen or fail to improve.        Deedee Arce, APRN  03/25/2024

## 2024-03-25 ENCOUNTER — OFFICE VISIT (OUTPATIENT)
Dept: OBSTETRICS AND GYNECOLOGY | Facility: CLINIC | Age: 44
End: 2024-03-25
Payer: MEDICAID

## 2024-03-25 VITALS
DIASTOLIC BLOOD PRESSURE: 73 MMHG | HEIGHT: 63 IN | BODY MASS INDEX: 22.5 KG/M2 | HEART RATE: 91 BPM | SYSTOLIC BLOOD PRESSURE: 106 MMHG | WEIGHT: 127 LBS

## 2024-03-25 DIAGNOSIS — Z11.3 SCREEN FOR STD (SEXUALLY TRANSMITTED DISEASE): ICD-10-CM

## 2024-03-25 DIAGNOSIS — N92.6 IRREGULAR MENSTRUAL BLEEDING: Primary | ICD-10-CM

## 2024-03-25 LAB
B-HCG UR QL: NEGATIVE
C TRACH RRNA CVX QL NAA+PROBE: NOT DETECTED
CANDIDA SPECIES: NEGATIVE
EXPIRATION DATE: NORMAL
GARDNERELLA VAGINALIS: NEGATIVE
HBV SURFACE AG SERPL QL IA: NORMAL
HCV AB SER DONR QL: NORMAL
HIV 1+2 AB+HIV1 P24 AG SERPL QL IA: NORMAL
INTERNAL NEGATIVE CONTROL: NORMAL
INTERNAL POSITIVE CONTROL: NORMAL
Lab: NORMAL
N GONORRHOEA RRNA SPEC QL NAA+PROBE: NOT DETECTED
PROLACTIN SERPL-MCNC: 14.4 NG/ML (ref 4.79–23.3)
T VAGINALIS DNA VAG QL PROBE+SIG AMP: NEGATIVE
TSH SERPL DL<=0.05 MIU/L-ACNC: 1.08 UIU/ML (ref 0.27–4.2)

## 2024-03-25 PROCEDURE — 1160F RVW MEDS BY RX/DR IN RCRD: CPT | Performed by: NURSE PRACTITIONER

## 2024-03-25 PROCEDURE — 84146 ASSAY OF PROLACTIN: CPT | Performed by: NURSE PRACTITIONER

## 2024-03-25 PROCEDURE — 99214 OFFICE O/P EST MOD 30 MIN: CPT | Performed by: NURSE PRACTITIONER

## 2024-03-25 PROCEDURE — 87480 CANDIDA DNA DIR PROBE: CPT | Performed by: NURSE PRACTITIONER

## 2024-03-25 PROCEDURE — 86592 SYPHILIS TEST NON-TREP QUAL: CPT | Performed by: NURSE PRACTITIONER

## 2024-03-25 PROCEDURE — G0432 EIA HIV-1/HIV-2 SCREEN: HCPCS | Performed by: NURSE PRACTITIONER

## 2024-03-25 PROCEDURE — 84443 ASSAY THYROID STIM HORMONE: CPT | Performed by: NURSE PRACTITIONER

## 2024-03-25 PROCEDURE — 1159F MED LIST DOCD IN RCRD: CPT | Performed by: NURSE PRACTITIONER

## 2024-03-25 PROCEDURE — 87591 N.GONORRHOEAE DNA AMP PROB: CPT | Performed by: NURSE PRACTITIONER

## 2024-03-25 PROCEDURE — 87660 TRICHOMONAS VAGIN DIR PROBE: CPT | Performed by: NURSE PRACTITIONER

## 2024-03-25 PROCEDURE — 87491 CHLMYD TRACH DNA AMP PROBE: CPT | Performed by: NURSE PRACTITIONER

## 2024-03-25 PROCEDURE — 87340 HEPATITIS B SURFACE AG IA: CPT | Performed by: NURSE PRACTITIONER

## 2024-03-25 PROCEDURE — 86803 HEPATITIS C AB TEST: CPT | Performed by: NURSE PRACTITIONER

## 2024-03-25 PROCEDURE — 87510 GARDNER VAG DNA DIR PROBE: CPT | Performed by: NURSE PRACTITIONER

## 2024-03-25 PROCEDURE — 81025 URINE PREGNANCY TEST: CPT | Performed by: NURSE PRACTITIONER

## 2024-03-25 NOTE — PATIENT INSTRUCTIONS
Venipuncture Blood Specimen Collection  Venipuncture performed in right arm by Fabiola Danielle with good hemostasis. Patient tolerated the procedure well without complications.   03/25/24   Fabiola Danielle

## 2024-03-27 LAB — RPR SER QL: NON REACTIVE

## 2024-05-06 ENCOUNTER — TELEPHONE (OUTPATIENT)
Dept: INTERNAL MEDICINE | Facility: CLINIC | Age: 44
End: 2024-05-06

## 2024-05-06 ENCOUNTER — OFFICE VISIT (OUTPATIENT)
Dept: INTERNAL MEDICINE | Facility: CLINIC | Age: 44
End: 2024-05-06
Payer: MEDICAID

## 2024-05-06 VITALS
HEART RATE: 72 BPM | OXYGEN SATURATION: 99 % | DIASTOLIC BLOOD PRESSURE: 70 MMHG | WEIGHT: 123.4 LBS | BODY MASS INDEX: 21.86 KG/M2 | SYSTOLIC BLOOD PRESSURE: 105 MMHG | TEMPERATURE: 98 F

## 2024-05-06 DIAGNOSIS — H60.332 ACUTE SWIMMER'S EAR OF LEFT SIDE: Primary | ICD-10-CM

## 2024-05-06 PROCEDURE — 99214 OFFICE O/P EST MOD 30 MIN: CPT | Performed by: INTERNAL MEDICINE

## 2024-05-06 RX ORDER — OFLOXACIN 3 MG/ML
SOLUTION/ DROPS OPHTHALMIC
Qty: 5 ML | Refills: 0 | Status: SHIPPED | OUTPATIENT
Start: 2024-05-06

## 2024-05-06 RX ORDER — AMOXICILLIN AND CLAVULANATE POTASSIUM 875; 125 MG/1; MG/1
1 TABLET, FILM COATED ORAL 2 TIMES DAILY
Qty: 14 TABLET | Refills: 0 | Status: SHIPPED | OUTPATIENT
Start: 2024-05-06 | End: 2024-05-13

## 2024-05-06 NOTE — TELEPHONE ENCOUNTER
Caller: EVELIA    Relationship to patient: [facility]    Best call back number: [phone]     Patient is needing: EVELIA IS CALLING TO REQUEST CLARIFICATION ON EYE DROP PRESCRIPTION THAT WAS SEND FOR PATIENT.

## 2024-05-15 ENCOUNTER — OFFICE VISIT (OUTPATIENT)
Dept: INTERNAL MEDICINE | Facility: CLINIC | Age: 44
End: 2024-05-15
Payer: MEDICAID

## 2024-05-15 VITALS
OXYGEN SATURATION: 98 % | HEIGHT: 63 IN | SYSTOLIC BLOOD PRESSURE: 115 MMHG | BODY MASS INDEX: 21.79 KG/M2 | HEART RATE: 83 BPM | DIASTOLIC BLOOD PRESSURE: 75 MMHG | TEMPERATURE: 97.5 F | WEIGHT: 123 LBS

## 2024-05-15 DIAGNOSIS — Z13.220 SCREENING FOR LIPID DISORDERS: Primary | ICD-10-CM

## 2024-05-15 DIAGNOSIS — F39 MOOD DISORDER: ICD-10-CM

## 2024-05-15 DIAGNOSIS — Z11.3 SCREENING EXAMINATION FOR STD (SEXUALLY TRANSMITTED DISEASE): ICD-10-CM

## 2024-05-15 DIAGNOSIS — Z12.11 SCREENING FOR COLON CANCER: ICD-10-CM

## 2024-05-15 DIAGNOSIS — G47.00 INSOMNIA, UNSPECIFIED TYPE: ICD-10-CM

## 2024-05-15 DIAGNOSIS — J06.9 UPPER RESPIRATORY TRACT INFECTION, UNSPECIFIED TYPE: ICD-10-CM

## 2024-05-15 DIAGNOSIS — N89.8 VAGINAL DISCHARGE: ICD-10-CM

## 2024-05-15 DIAGNOSIS — F41.9 ANXIETY: ICD-10-CM

## 2024-05-15 LAB
ALBUMIN SERPL-MCNC: 4.8 G/DL (ref 3.5–5.2)
ALBUMIN/GLOB SERPL: 1.9 G/DL
ALP SERPL-CCNC: 74 U/L (ref 39–117)
ALT SERPL W P-5'-P-CCNC: 13 U/L (ref 1–33)
ANION GAP SERPL CALCULATED.3IONS-SCNC: 10 MMOL/L (ref 5–15)
AST SERPL-CCNC: 16 U/L (ref 1–32)
BASOPHILS # BLD AUTO: 0.04 10*3/MM3 (ref 0–0.2)
BASOPHILS NFR BLD AUTO: 0.4 % (ref 0–1.5)
BILIRUB SERPL-MCNC: <0.2 MG/DL (ref 0–1.2)
BUN SERPL-MCNC: 9 MG/DL (ref 6–20)
BUN/CREAT SERPL: 10.7 (ref 7–25)
CALCIUM SPEC-SCNC: 9.6 MG/DL (ref 8.6–10.5)
CANDIDA SPECIES: NEGATIVE
CHLORIDE SERPL-SCNC: 103 MMOL/L (ref 98–107)
CHOLEST SERPL-MCNC: 240 MG/DL (ref 0–200)
CO2 SERPL-SCNC: 25 MMOL/L (ref 22–29)
CREAT SERPL-MCNC: 0.84 MG/DL (ref 0.57–1)
DEPRECATED RDW RBC AUTO: 41.2 FL (ref 37–54)
EGFRCR SERPLBLD CKD-EPI 2021: 87.5 ML/MIN/1.73
EOSINOPHIL # BLD AUTO: 0.11 10*3/MM3 (ref 0–0.4)
EOSINOPHIL NFR BLD AUTO: 1 % (ref 0.3–6.2)
ERYTHROCYTE [DISTWIDTH] IN BLOOD BY AUTOMATED COUNT: 11.8 % (ref 12.3–15.4)
GARDNERELLA VAGINALIS: POSITIVE
GLOBULIN UR ELPH-MCNC: 2.5 GM/DL
GLUCOSE SERPL-MCNC: 86 MG/DL (ref 65–99)
HAV IGM SERPL QL IA: NORMAL
HBV CORE IGM SERPL QL IA: NORMAL
HBV SURFACE AG SERPL QL IA: NORMAL
HCT VFR BLD AUTO: 47.8 % (ref 34–46.6)
HCV AB SER QL: NORMAL
HDLC SERPL-MCNC: 61 MG/DL (ref 40–60)
HGB BLD-MCNC: 15.6 G/DL (ref 12–15.9)
HIV 1+2 AB+HIV1 P24 AG SERPL QL IA: NORMAL
IMM GRANULOCYTES # BLD AUTO: 0.05 10*3/MM3 (ref 0–0.05)
IMM GRANULOCYTES NFR BLD AUTO: 0.5 % (ref 0–0.5)
LDLC SERPL CALC-MCNC: 161 MG/DL (ref 0–100)
LDLC/HDLC SERPL: 2.59 {RATIO}
LYMPHOCYTES # BLD AUTO: 3.76 10*3/MM3 (ref 0.7–3.1)
LYMPHOCYTES NFR BLD AUTO: 35.9 % (ref 19.6–45.3)
MCH RBC QN AUTO: 30.5 PG (ref 26.6–33)
MCHC RBC AUTO-ENTMCNC: 32.6 G/DL (ref 31.5–35.7)
MCV RBC AUTO: 93.5 FL (ref 79–97)
MONOCYTES # BLD AUTO: 0.49 10*3/MM3 (ref 0.1–0.9)
MONOCYTES NFR BLD AUTO: 4.7 % (ref 5–12)
NEUTROPHILS NFR BLD AUTO: 57.5 % (ref 42.7–76)
NEUTROPHILS NFR BLD AUTO: 6.03 10*3/MM3 (ref 1.7–7)
NRBC BLD AUTO-RTO: 0 /100 WBC (ref 0–0.2)
PLATELET # BLD AUTO: 392 10*3/MM3 (ref 140–450)
PMV BLD AUTO: 10.1 FL (ref 6–12)
POTASSIUM SERPL-SCNC: 4.4 MMOL/L (ref 3.5–5.2)
PROT SERPL-MCNC: 7.3 G/DL (ref 6–8.5)
RBC # BLD AUTO: 5.11 10*6/MM3 (ref 3.77–5.28)
SODIUM SERPL-SCNC: 138 MMOL/L (ref 136–145)
T VAGINALIS DNA VAG QL PROBE+SIG AMP: NEGATIVE
TRIGL SERPL-MCNC: 105 MG/DL (ref 0–150)
TSH SERPL DL<=0.05 MIU/L-ACNC: 0.93 UIU/ML (ref 0.27–4.2)
VLDLC SERPL-MCNC: 18 MG/DL (ref 5–40)
WBC NRBC COR # BLD AUTO: 10.48 10*3/MM3 (ref 3.4–10.8)

## 2024-05-15 PROCEDURE — 86592 SYPHILIS TEST NON-TREP QUAL: CPT | Performed by: NURSE PRACTITIONER

## 2024-05-15 PROCEDURE — 87480 CANDIDA DNA DIR PROBE: CPT | Performed by: NURSE PRACTITIONER

## 2024-05-15 PROCEDURE — G0432 EIA HIV-1/HIV-2 SCREEN: HCPCS | Performed by: NURSE PRACTITIONER

## 2024-05-15 PROCEDURE — 80074 ACUTE HEPATITIS PANEL: CPT | Performed by: NURSE PRACTITIONER

## 2024-05-15 PROCEDURE — 80053 COMPREHEN METABOLIC PANEL: CPT | Performed by: NURSE PRACTITIONER

## 2024-05-15 PROCEDURE — 87510 GARDNER VAG DNA DIR PROBE: CPT | Performed by: NURSE PRACTITIONER

## 2024-05-15 PROCEDURE — 1159F MED LIST DOCD IN RCRD: CPT | Performed by: NURSE PRACTITIONER

## 2024-05-15 PROCEDURE — 99214 OFFICE O/P EST MOD 30 MIN: CPT | Performed by: NURSE PRACTITIONER

## 2024-05-15 PROCEDURE — 87661 TRICHOMONAS VAGINALIS AMPLIF: CPT | Performed by: NURSE PRACTITIONER

## 2024-05-15 PROCEDURE — 86695 HERPES SIMPLEX TYPE 1 TEST: CPT | Performed by: NURSE PRACTITIONER

## 2024-05-15 PROCEDURE — 87491 CHLMYD TRACH DNA AMP PROBE: CPT | Performed by: NURSE PRACTITIONER

## 2024-05-15 PROCEDURE — 85025 COMPLETE CBC W/AUTO DIFF WBC: CPT | Performed by: NURSE PRACTITIONER

## 2024-05-15 PROCEDURE — 1160F RVW MEDS BY RX/DR IN RCRD: CPT | Performed by: NURSE PRACTITIONER

## 2024-05-15 PROCEDURE — 84443 ASSAY THYROID STIM HORMONE: CPT | Performed by: NURSE PRACTITIONER

## 2024-05-15 PROCEDURE — 87591 N.GONORRHOEAE DNA AMP PROB: CPT | Performed by: NURSE PRACTITIONER

## 2024-05-15 PROCEDURE — 86696 HERPES SIMPLEX TYPE 2 TEST: CPT | Performed by: NURSE PRACTITIONER

## 2024-05-15 PROCEDURE — 80061 LIPID PANEL: CPT | Performed by: NURSE PRACTITIONER

## 2024-05-15 PROCEDURE — 87660 TRICHOMONAS VAGIN DIR PROBE: CPT | Performed by: NURSE PRACTITIONER

## 2024-05-15 RX ORDER — PSEUDOEPHEDRINE HCL 120 MG/1
120 TABLET, FILM COATED, EXTENDED RELEASE ORAL EVERY 12 HOURS
Qty: 20 TABLET | Refills: 0 | Status: SHIPPED | OUTPATIENT
Start: 2024-05-15

## 2024-05-15 RX ORDER — TRAZODONE HYDROCHLORIDE 150 MG/1
150 TABLET ORAL NIGHTLY
COMMUNITY
Start: 2024-05-13 | End: 2024-05-23 | Stop reason: SDUPTHER

## 2024-05-15 NOTE — PROGRESS NOTES
"Chief Complaint  personal    Subjective          Janee Mensah presents to [facility]  Vaginal Discharge  The patient's primary symptoms include vaginal discharge. The patient's pertinent negatives include no genital itching, genital lesions, genital odor, genital rash, missed menses, pelvic pain or vaginal bleeding. This is a new problem. Pertinent negatives include no abdominal pain, anorexia, back pain, chills, constipation, diarrhea, discolored urine, dysuria, fever, flank pain, frequency, headaches, hematuria, joint pain, joint swelling, nausea, painful intercourse, rash, sore throat, urgency or vomiting. The vaginal discharge was thick and yellow. She has tried nothing for the symptoms. She is sexually active.         Current Outpatient Medications   Medication Instructions    albuterol sulfate  (90 Base) MCG/ACT inhaler 2 puffs, Inhalation, Every 4 Hours PRN    atorvastatin (LIPITOR) 40 mg, Oral, Daily    lamoTRIgine (LAMICTAL) 150 mg, Oral, Daily    metroNIDAZOLE (FLAGYL) 500 mg, Oral, 2 Times Daily    Nicotine 21-14-7 MG/24HR kit 1 kit, Transdermal, Take As Directed    ofloxacin (Ocuflox) 0.3 % ophthalmic solution Apply to left ear BID x7 days    pseudoephedrine (SUDAFED) 120 mg, Oral, Every 12 Hours    traZODone (DESYREL) 150 mg, Oral, Nightly       The following portions of the patient's history were reviewed and updated as appropriate: allergies, current medications, past family history, past medical history, past social history, past surgical history, and problem list.    Objective   Vital Signs:   /75 (BP Location: Left arm, Patient Position: Sitting, Cuff Size: Adult)   Pulse 83   Temp 97.5 °F (36.4 °C) (Temporal)   Ht 160 cm (63\")   Wt 55.8 kg (123 lb)   SpO2 98%   BMI 21.79 kg/m²     Wt Readings from Last 3 Encounters:   05/15/24 55.8 kg (123 lb)   05/06/24 56 kg (123 lb 6.4 oz)   03/25/24 57.6 kg (127 lb)     BP Readings " from Last 3 Encounters:   05/15/24 115/75   05/06/24 105/70   03/25/24 106/73     Physical Exam   Appearance: No acute distress, well-nourished  Head: normocephalic, atraumatic  Eyes: extraocular movements intact, no scleral icterus, no conjunctival injection  Ears, Nose, and Throat: external ears normal, nares patent, moist mucous membranes, tympanic membranes clear bilaterally. Tonsils within normal limits. Oropharynx clear.   Cardiovascular: regular rate and rhythm. no murmurs, rubs, or gallops. no edema  Respiratory: breathing comfortably, symmetric chest rise, clear to auscultation bilaterally. No wheezes, rales, or rhonchi.  Neuro: alert and moves all extremities equally  Lymph: no occipital, cervical, submandibular,or supraclavicular lymphadenopathy.      Result Review :   The following data was reviewed by: ANGY Limon on 05/15/2024:  Common labs          2/2/2024    14:45 5/15/2024    15:44   Common Labs   Glucose 73  86    BUN 9  9    Creatinine 0.73  0.84    Sodium 138  138    Potassium 4.3  4.4    Chloride 102  103    Calcium 9.7  9.6    Albumin 4.5  4.8    Total Bilirubin <0.2  <0.2    Alkaline Phosphatase 63  74    AST (SGOT) 16  16    ALT (SGPT) 18  13    WBC 8.38  10.48    Hemoglobin 14.6  15.6    Hematocrit 43.9  47.8    Platelets 386  392    Total Cholesterol 236  240    Triglycerides 100  105    HDL Cholesterol 54  61    LDL Cholesterol  164  161             Lab Results   Component Value Date    COVID19 Detected (C) 01/22/2022    POCPREGUR Negative 03/25/2024    BILIRUBINUR Negative 05/29/2023          Assessment and Plan    Diagnoses and all orders for this visit:    1. Screening for lipid disorders (Primary)  -     Lipid Panel    2. Mood disorder  -     TSH Rfx On Abnormal To Free T4  -     Comprehensive Metabolic Panel  -     CBC & Differential    3. Anxiety  -     TSH Rfx On Abnormal To Free T4  -     Comprehensive Metabolic Panel  -     CBC & Differential    4. Upper respiratory  tract infection, unspecified type  -     pseudoephedrine (SUDAFED) 120 MG 12 hr tablet; Take 1 tablet by mouth Every 12 (Twelve) Hours.  Dispense: 20 tablet; Refill: 0    5. Screening for colon cancer  -     Ambulatory Referral For Screening Colonoscopy    6. Screening examination for STD (sexually transmitted disease)  -     Hepatitis panel, acute  -     HIV-1/O/2 ANTIGEN/ANTIBODY, 4TH GENERATION  -     HSV 1 and 2-Specific Ab, IgG  -     RPR  -     Gardnerella vaginalis, Trichomonas vaginalis, Candida albicans, DNA - Swab, Cervix  -     Chlamydia trachomatis, Neisseria gonorrhoeae, Trichomonas vaginalis, PCR - Swab, Urine, Random Void    7. Vaginal discharge  -     Hepatitis panel, acute  -     HIV-1/O/2 ANTIGEN/ANTIBODY, 4TH GENERATION  -     HSV 1 and 2-Specific Ab, IgG  -     RPR  -     Gardnerella vaginalis, Trichomonas vaginalis, Candida albicans, DNA - Swab, Cervix  -     Chlamydia trachomatis, Neisseria gonorrhoeae, Trichomonas vaginalis, PCR - Swab, Urine, Random Void    8. Insomnia, unspecified type  -     traZODone (DESYREL) 150 MG tablet; Take 1 tablet by mouth Every Night.  Dispense: 90 tablet; Refill: 1          Medications Discontinued During This Encounter   Medication Reason    guaifenesin-dextromethorphan 600-30 mg (MUCINEX DM)  MG tablet sustained-release 12 hour *Therapy completed    traZODone (DESYREL) 100 MG tablet     pseudoephedrine (SUDAFED) 120 MG 12 hr tablet Reorder    traZODone (DESYREL) 150 MG tablet Reorder          Follow Up   Return in about 6 months (around 11/15/2024).  Patient was given instructions and counseling regarding her condition or for health maintenance advice. Please see specific information pulled into the AVS if appropriate.       Carlee Rush, ANGY  05/23/24  10:49 EDT

## 2024-05-16 LAB — RPR SER QL: NORMAL

## 2024-05-17 ENCOUNTER — TELEPHONE (OUTPATIENT)
Dept: INTERNAL MEDICINE | Facility: CLINIC | Age: 44
End: 2024-05-17
Payer: MEDICAID

## 2024-05-17 DIAGNOSIS — B96.89 BV (BACTERIAL VAGINOSIS): Primary | ICD-10-CM

## 2024-05-17 DIAGNOSIS — N76.0 BV (BACTERIAL VAGINOSIS): Primary | ICD-10-CM

## 2024-05-17 DIAGNOSIS — E78.5 HYPERLIPIDEMIA, UNSPECIFIED HYPERLIPIDEMIA TYPE: ICD-10-CM

## 2024-05-17 LAB
C TRACH RRNA SPEC QL NAA+PROBE: NEGATIVE
HSV1 IGG SER IA-ACNC: 21.6 INDEX (ref 0–0.9)
HSV2 IGG SER IA-ACNC: <0.91 INDEX (ref 0–0.9)
N GONORRHOEA RRNA SPEC QL NAA+PROBE: NEGATIVE
T VAGINALIS RRNA SPEC QL NAA+PROBE: NEGATIVE

## 2024-05-17 RX ORDER — ATORVASTATIN CALCIUM 40 MG/1
40 TABLET, FILM COATED ORAL DAILY
Qty: 90 TABLET | Refills: 1 | Status: SHIPPED | OUTPATIENT
Start: 2024-05-17

## 2024-05-17 RX ORDER — METRONIDAZOLE 500 MG/1
500 TABLET ORAL 2 TIMES DAILY
Qty: 14 TABLET | Refills: 0 | Status: SHIPPED | OUTPATIENT
Start: 2024-05-17 | End: 2024-05-24

## 2024-05-17 NOTE — TELEPHONE ENCOUNTER
Attempted to contact patient.   Verified .     Made aware of results -     Cholesterol very high - sending in medication.     HSV1 positive.     Positive for bacterial vaginosis. This is not a sexually transmitted disease. It is an overgrowth of bacteria within the vagina.   I am sending in flagyl (which is an antibiotic) x 7 days.   Avoid intercourse until completion of the antibiotic.   Also avoid alcohol while taking the Flagyl.        Patient verbalized understanding.

## 2024-05-23 RX ORDER — TRAZODONE HYDROCHLORIDE 150 MG/1
150 TABLET ORAL NIGHTLY
Qty: 90 TABLET | Refills: 1 | Status: SHIPPED | OUTPATIENT
Start: 2024-05-23

## 2024-06-07 NOTE — PROGRESS NOTES
"GYN Problem/Follow Up Visit    Chief Complaint   Patient presents with    Follow-up     Std testing            HPI  Janee Mensah is a 43 y.o. female, , who presents for evaluation of vaginal discharge, was treated for BV -, treated with oral metronidazole, no improvement after use. Denies vaginal discomfort, denies vaginal odor, states there is a small amount of white/yellow discharge that has persisted.     Additional OB/GYN History   No LMP recorded.  Current contraception: contraceptive methods: Tubal ligation    Past Medical History:   Diagnosis Date    Abnormal Pap smear of cervix     Anxiety and depression     Cervical cancer, FIGO stage I     HPV (human papilloma virus) infection       Past Surgical History:   Procedure Laterality Date    CERVICAL CONE BIOPSY      Metzinger, cervical dysplasia    LAPAROSCOPIC TUBAL LIGATION      does not recall the year.      Family History   Problem Relation Age of Onset    Breast cancer Neg Hx     Ovarian cancer Neg Hx     Uterine cancer Neg Hx     Prostate cancer Neg Hx     Colon cancer Neg Hx      Allergies as of 2024    (No Known Allergies)      The additional following portions of the patient's history were reviewed and updated as appropriate: allergies, current medications, past family history, past medical history, past social history, past surgical history, and problem list.    Review of Systems    See HPI for pertinent ROS    Objective   /53   Pulse 76   Ht 160 cm (63\")   Wt 55.8 kg (123 lb)   BMI 21.79 kg/m²     Physical Exam  Vitals and nursing note reviewed. Exam conducted with a chaperone present.   Constitutional:       Appearance: Normal appearance.   Cardiovascular:      Rate and Rhythm: Normal rate.   Pulmonary:      Effort: Pulmonary effort is normal.   Genitourinary:     General: Normal vulva.      Vagina: Normal. Bleeding (scant amount yellow vaginal discharge, small amount of menstrual blood in vault) present.     "  Cervix: Normal.      Uterus: Normal.       Adnexa: Right adnexa normal and left adnexa normal.   Lymphadenopathy:      Lower Body: No right inguinal adenopathy. No left inguinal adenopathy.   Skin:     General: Skin is warm and dry.   Neurological:      Mental Status: She is alert and oriented to person, place, and time.            Assessment and Plan    Diagnoses and all orders for this visit:    1. Acute vaginitis (Primary)  -     Chlamydia trachomatis, Neisseria gonorrhoeae, PCR - Swab, Cervix  -     Gardnerella vaginalis, Trichomonas vaginalis, Candida albicans, DNA - Swab, Vagina        Counseling:  SAFE SEX/condoms importance reviewed.    Bacterial Vaginosis prevention: recommend use hypoallergenic, PH balanced products including soaps and detergents, sensitive skin products. Avoid scented liners, tampons, wipes, or scented toilet paper. Condom use, avoid multiple partners, douching, bubble bath, anal contact during intercourse, thong underwear, and shaving in genital area. Over the counter vaginal probiotic suppositories may be used to maintain a healthy vaginal PH- example: [employer] online (Vagibiom)        Follow Up:  Return if symptoms worsen or fail to improve.        Deedee Arce, APRN  06/11/2024

## 2024-06-11 ENCOUNTER — OFFICE VISIT (OUTPATIENT)
Dept: OBSTETRICS AND GYNECOLOGY | Facility: CLINIC | Age: 44
End: 2024-06-11
Payer: MEDICAID

## 2024-06-11 ENCOUNTER — TELEPHONE (OUTPATIENT)
Dept: OBSTETRICS AND GYNECOLOGY | Facility: CLINIC | Age: 44
End: 2024-06-11
Payer: MEDICAID

## 2024-06-11 VITALS
SYSTOLIC BLOOD PRESSURE: 139 MMHG | BODY MASS INDEX: 21.79 KG/M2 | DIASTOLIC BLOOD PRESSURE: 53 MMHG | HEIGHT: 63 IN | HEART RATE: 76 BPM | WEIGHT: 123 LBS

## 2024-06-11 DIAGNOSIS — N76.0 ACUTE VAGINITIS: Primary | ICD-10-CM

## 2024-06-11 LAB
C TRACH RRNA CVX QL NAA+PROBE: NOT DETECTED
CANDIDA SPECIES: NEGATIVE
GARDNERELLA VAGINALIS: NEGATIVE
N GONORRHOEA RRNA SPEC QL NAA+PROBE: NOT DETECTED
T VAGINALIS DNA VAG QL PROBE+SIG AMP: NEGATIVE

## 2024-06-11 PROCEDURE — 87480 CANDIDA DNA DIR PROBE: CPT | Performed by: NURSE PRACTITIONER

## 2024-06-11 PROCEDURE — 99213 OFFICE O/P EST LOW 20 MIN: CPT | Performed by: NURSE PRACTITIONER

## 2024-06-11 PROCEDURE — 87591 N.GONORRHOEAE DNA AMP PROB: CPT | Performed by: NURSE PRACTITIONER

## 2024-06-11 PROCEDURE — 99459 PELVIC EXAMINATION: CPT | Performed by: NURSE PRACTITIONER

## 2024-06-11 PROCEDURE — 87491 CHLMYD TRACH DNA AMP PROBE: CPT | Performed by: NURSE PRACTITIONER

## 2024-06-11 PROCEDURE — 87660 TRICHOMONAS VAGIN DIR PROBE: CPT | Performed by: NURSE PRACTITIONER

## 2024-06-11 PROCEDURE — 87510 GARDNER VAG DNA DIR PROBE: CPT | Performed by: NURSE PRACTITIONER

## 2024-06-11 NOTE — TELEPHONE ENCOUNTER
06 11 2024 05:05pm pt was left voice mail to call back and reschedule due to Dr. Garcia is leaving the practice 08 31 2024 and she can schedule with Deedee if she likes or any of our other providers.[email or web address]

## 2024-06-17 NOTE — TELEPHONE ENCOUNTER
Patient was called and rescheduled from Dr Dustin Garcia 12/16/24 schedule to Deedee's schedule (by pt request).[email or web address]

## 2024-08-16 ENCOUNTER — OFFICE VISIT (OUTPATIENT)
Dept: INTERNAL MEDICINE | Facility: CLINIC | Age: 44
End: 2024-08-16
Payer: MEDICAID

## 2024-08-16 VITALS
DIASTOLIC BLOOD PRESSURE: 80 MMHG | WEIGHT: 115 LBS | OXYGEN SATURATION: 96 % | HEIGHT: 63 IN | HEART RATE: 82 BPM | TEMPERATURE: 97.8 F | SYSTOLIC BLOOD PRESSURE: 123 MMHG | BODY MASS INDEX: 20.38 KG/M2

## 2024-08-16 DIAGNOSIS — Z13.220 SCREENING FOR LIPID DISORDERS: ICD-10-CM

## 2024-08-16 DIAGNOSIS — Z72.0 TOBACCO USE: ICD-10-CM

## 2024-08-16 DIAGNOSIS — G47.00 INSOMNIA, UNSPECIFIED TYPE: Chronic | ICD-10-CM

## 2024-08-16 DIAGNOSIS — F39 MOOD DISORDER: Primary | Chronic | ICD-10-CM

## 2024-08-16 DIAGNOSIS — Z00.00 ANNUAL PHYSICAL EXAM: ICD-10-CM

## 2024-08-16 LAB
BASOPHILS # BLD AUTO: 0.04 10*3/MM3 (ref 0–0.2)
BASOPHILS NFR BLD AUTO: 0.5 % (ref 0–1.5)
CHOLEST SERPL-MCNC: 215 MG/DL (ref 0–200)
DEPRECATED RDW RBC AUTO: 41.7 FL (ref 37–54)
EOSINOPHIL # BLD AUTO: 0.06 10*3/MM3 (ref 0–0.4)
EOSINOPHIL NFR BLD AUTO: 0.7 % (ref 0.3–6.2)
ERYTHROCYTE [DISTWIDTH] IN BLOOD BY AUTOMATED COUNT: 12.3 % (ref 12.3–15.4)
HCT VFR BLD AUTO: 43.4 % (ref 34–46.6)
HDLC SERPL-MCNC: 55 MG/DL (ref 40–60)
HGB BLD-MCNC: 14.5 G/DL (ref 12–15.9)
IMM GRANULOCYTES # BLD AUTO: 0.02 10*3/MM3 (ref 0–0.05)
IMM GRANULOCYTES NFR BLD AUTO: 0.2 % (ref 0–0.5)
LDLC SERPL CALC-MCNC: 148 MG/DL (ref 0–100)
LDLC/HDLC SERPL: 2.66 {RATIO}
LYMPHOCYTES # BLD AUTO: 3.63 10*3/MM3 (ref 0.7–3.1)
LYMPHOCYTES NFR BLD AUTO: 41.9 % (ref 19.6–45.3)
MCH RBC QN AUTO: 31.2 PG (ref 26.6–33)
MCHC RBC AUTO-ENTMCNC: 33.4 G/DL (ref 31.5–35.7)
MCV RBC AUTO: 93.3 FL (ref 79–97)
MONOCYTES # BLD AUTO: 0.48 10*3/MM3 (ref 0.1–0.9)
MONOCYTES NFR BLD AUTO: 5.5 % (ref 5–12)
NEUTROPHILS NFR BLD AUTO: 4.44 10*3/MM3 (ref 1.7–7)
NEUTROPHILS NFR BLD AUTO: 51.2 % (ref 42.7–76)
NRBC BLD AUTO-RTO: 0 /100 WBC (ref 0–0.2)
PLATELET # BLD AUTO: 325 10*3/MM3 (ref 140–450)
PMV BLD AUTO: 10.1 FL (ref 6–12)
RBC # BLD AUTO: 4.65 10*6/MM3 (ref 3.77–5.28)
TRIGL SERPL-MCNC: 69 MG/DL (ref 0–150)
TSH SERPL DL<=0.05 MIU/L-ACNC: 0.92 UIU/ML (ref 0.27–4.2)
VLDLC SERPL-MCNC: 12 MG/DL (ref 5–40)
WBC NRBC COR # BLD AUTO: 8.67 10*3/MM3 (ref 3.4–10.8)

## 2024-08-16 PROCEDURE — 1160F RVW MEDS BY RX/DR IN RCRD: CPT | Performed by: NURSE PRACTITIONER

## 2024-08-16 PROCEDURE — 99396 PREV VISIT EST AGE 40-64: CPT | Performed by: NURSE PRACTITIONER

## 2024-08-16 PROCEDURE — 84443 ASSAY THYROID STIM HORMONE: CPT | Performed by: NURSE PRACTITIONER

## 2024-08-16 PROCEDURE — 85025 COMPLETE CBC W/AUTO DIFF WBC: CPT | Performed by: NURSE PRACTITIONER

## 2024-08-16 PROCEDURE — 1159F MED LIST DOCD IN RCRD: CPT | Performed by: NURSE PRACTITIONER

## 2024-08-16 PROCEDURE — 80061 LIPID PANEL: CPT | Performed by: NURSE PRACTITIONER

## 2024-08-16 PROCEDURE — 80053 COMPREHEN METABOLIC PANEL: CPT | Performed by: NURSE PRACTITIONER

## 2024-08-16 RX ORDER — TRAZODONE HYDROCHLORIDE 150 MG/1
150 TABLET ORAL NIGHTLY
Qty: 90 TABLET | Refills: 1 | Status: SHIPPED | OUTPATIENT
Start: 2024-08-16

## 2024-08-16 NOTE — PROGRESS NOTES
Chief Complaint  Insomnia (6 month follow-up), Mood Disorder (6 month follow-up/), and Nicotine Dependence (6 month follow-up/)    Subjective          Janee Mensah presents to [facility]  History of Present Illness    Anxiety  Presents for follow up visit. Symptoms include depressed mood, excessive worry, irritability, nervous/anxious behavior and panic. Patient reports no chest pain, compulsions, confusion, decreased concentration, dizziness, dry mouth, feeling of choking, hyperventilation, impotence, insomnia, malaise, muscle tension, nausea, obsessions, palpitations, restlessness, shortness of breath or suicidal ideas.     Risk factors:  passed away 6 years ago  Treatments tried: currently on hydroxyzine and lamictal    Insomnia  This is a chronic problem. Pertinent negatives include no abdominal pain, anorexia, arthralgias, change in bowel habit, chest pain, chills, congestion, coughing, diaphoresis, fatigue, fever, headaches, joint swelling, myalgias, nausea, neck pain, numbness, rash, sore throat, swollen glands, urinary symptoms, vertigo, visual change, vomiting or weakness. Treatments tried: trazodone  The treatment provided significant relief.     Current Outpatient Medications   Medication Instructions    albuterol sulfate  (90 Base) MCG/ACT inhaler 2 puffs, Inhalation, Every 4 Hours PRN    atorvastatin (LIPITOR) 40 mg, Oral, Daily    lamoTRIgine (LAMICTAL) 150 mg, Oral, Daily    Nicotine 21-14-7 MG/24HR kit 1 kit, Transdermal, Take As Directed    traZODone (DESYREL) 150 mg, Oral, Nightly       The following portions of the patient's history were reviewed and updated as appropriate: allergies, current medications, past family history, past medical history, past social history, past surgical history, and problem list.    Objective   Vital Signs:   /80 (BP Location: Right arm, Patient Position: Sitting, Cuff Size: Adult)   Pulse  "82   Temp 97.8 °F (36.6 °C) (Temporal)   Ht 160 cm (63\")   Wt 52.2 kg (115 lb)   SpO2 96%   BMI 20.37 kg/m²     BP Readings from Last 3 Encounters:   08/16/24 123/80   06/11/24 139/53   05/15/24 115/75     Wt Readings from Last 3 Encounters:   08/16/24 52.2 kg (115 lb)   06/11/24 55.8 kg (123 lb)   05/15/24 55.8 kg (123 lb)     BMI is within normal parameters. No other follow-up for BMI required.     Physical Exam     Appearance: No acute distress, well-nourished  Head: normocephalic, atraumatic  Eyes: extraocular movements intact, no scleral icterus, no conjunctival injection  Ears, Nose, and Throat: external ears normal, nares patent, moist mucous membranes  Cardiovascular: regular rate and rhythm. no murmurs, rubs, or gallops. no edema  Respiratory: breathing comfortably, symmetric chest rise, clear to auscultation bilaterally. No wheezes, rales, or rhonchi.  Neuro: alert and oriented to time, place, and person. Normal gait  Psych: normal mood and affect     Result Review :   The following data was reviewed by: ANGY Limon on 08/16/2024:  Common labs          2/2/2024    14:45 5/15/2024    15:44 8/16/2024    15:57   Common Labs   Glucose 73  86  80    BUN 9  9  4    Creatinine 0.73  0.84  0.64    Sodium 138  138  137    Potassium 4.3  4.4  3.8    Chloride 102  103  101    Calcium 9.7  9.6  9.9    Albumin 4.5  4.8  4.7    Total Bilirubin <0.2  <0.2  0.2    Alkaline Phosphatase 63  74  76    AST (SGOT) 16  16  19    ALT (SGPT) 18  13  9    WBC 8.38  10.48  8.67    Hemoglobin 14.6  15.6  14.5    Hematocrit 43.9  47.8  43.4    Platelets 386  392  325    Total Cholesterol 236  240  215    Triglycerides 100  105  69    HDL Cholesterol 54  61  55    LDL Cholesterol  164  161  148             Lab Results   Component Value Date    COVID19 Detected (C) 01/22/2022    POCPREGUR Negative 03/25/2024    BILIRUBINUR Negative 05/29/2023            Assessment and Plan    Diagnoses and all orders for this " visit:    1. Mood disorder (Primary)  Comments:  well controlled on current regimen; follows with psych  Orders:  -     TSH Rfx On Abnormal To Free T4  -     Comprehensive Metabolic Panel  -     CBC & Differential    2. Insomnia, unspecified type  Comments:  well controlled  Orders:  -     TSH Rfx On Abnormal To Free T4  -     Comprehensive Metabolic Panel  -     CBC & Differential  -     traZODone (DESYREL) 150 MG tablet; Take 1 tablet by mouth Every Night.  Dispense: 90 tablet; Refill: 1    3. Tobacco use  -     Nicotine 21-14-7 MG/24HR kit; Place 1 kit on the skin as directed by provider Take As Directed.  Dispense: 56 each; Refill: 0    4. Screening for lipid disorders  -     Lipid Panel    5. Tobacco use  Comments:  will trial nicotine patches.  Orders:  -     Nicotine 21-14-7 MG/24HR kit; Place 1 kit on the skin as directed by provider Take As Directed.  Dispense: 56 each; Refill: 0    6. Annual physical exam      Janee Mensah  reports that she has been smoking cigarettes. She has a 30 pack-year smoking history. She has been exposed to tobacco smoke. She has never used smokeless tobacco. I have educated her on the risk of diseases from using tobacco products such as cancer, COPD, and heart disease.     I advised her to quit and she is willing to quit. We have discussed the following method/s for tobacco cessation:  Prescription Medication.  Together we have set a quit date for 1 month from today.  She will follow up with me in 3 months or sooner to check on her progress.    I spent 5 minutes counseling the patient.     Advised on diet, physical activity, sunscreen, helmet, texting and driving, etc        Medications Discontinued During This Encounter   Medication Reason    traZODone (DESYREL) 150 MG tablet Reorder    ofloxacin (Ocuflox) 0.3 % ophthalmic solution     pseudoephedrine (SUDAFED) 120 MG 12 hr tablet     Nicotine 21-14-7 MG/24HR kit Reorder          Follow Up   Return in about 6 months  (around 2/16/2025).  Patient was given instructions and counseling regarding her condition or for health maintenance advice. Please see specific information pulled into the AVS if appropriate.       Carlee Rush, ANGY  08/21/24  14:11 EDT

## 2024-08-17 LAB
ALBUMIN SERPL-MCNC: 4.7 G/DL (ref 3.5–5.2)
ALBUMIN/GLOB SERPL: 2.1 G/DL
ALP SERPL-CCNC: 76 U/L (ref 39–117)
ALT SERPL W P-5'-P-CCNC: 9 U/L (ref 1–33)
ANION GAP SERPL CALCULATED.3IONS-SCNC: 10 MMOL/L (ref 5–15)
AST SERPL-CCNC: 19 U/L (ref 1–32)
BILIRUB SERPL-MCNC: 0.2 MG/DL (ref 0–1.2)
BUN SERPL-MCNC: 4 MG/DL (ref 6–20)
BUN/CREAT SERPL: 6.3 (ref 7–25)
CALCIUM SPEC-SCNC: 9.9 MG/DL (ref 8.6–10.5)
CHLORIDE SERPL-SCNC: 101 MMOL/L (ref 98–107)
CO2 SERPL-SCNC: 26 MMOL/L (ref 22–29)
CREAT SERPL-MCNC: 0.64 MG/DL (ref 0.57–1)
EGFRCR SERPLBLD CKD-EPI 2021: 111.2 ML/MIN/1.73
GLOBULIN UR ELPH-MCNC: 2.2 GM/DL
GLUCOSE SERPL-MCNC: 80 MG/DL (ref 65–99)
POTASSIUM SERPL-SCNC: 3.8 MMOL/L (ref 3.5–5.2)
PROT SERPL-MCNC: 6.9 G/DL (ref 6–8.5)
SODIUM SERPL-SCNC: 137 MMOL/L (ref 136–145)

## 2024-08-21 ENCOUNTER — TELEPHONE (OUTPATIENT)
Dept: INTERNAL MEDICINE | Facility: CLINIC | Age: 44
End: 2024-08-21
Payer: MEDICAID

## 2024-08-21 DIAGNOSIS — Z72.0 TOBACCO USE: Primary | ICD-10-CM

## 2024-08-21 RX ORDER — NICOTINE 21-14-7MG
1 KIT TRANSDERMAL TAKE AS DIRECTED
Qty: 56 EACH | Refills: 0 | Status: SHIPPED | OUTPATIENT
Start: 2024-08-21 | End: 2024-08-23

## 2024-08-21 NOTE — TELEPHONE ENCOUNTER
Caller: [id number] - [place], [place] - [address] AT [facility] [phone] [facility] [phone] FX    Relationship to patient: Pharmacy    Best call back number: [phone]    Patient is needing: PHARMACIST CALLED NEEDING TO SPEAK WITH CLINICAL STAFF REGARDING PATIENTS PRESCRIPTION FOR A NICOTINE KIT THAT WAS SENT OVER. CALLER STATES THEY DO NOT HAVE THE FULL KIT IN STOCK AND WANT TO KNOW IF SEPARATE PRESCRIPTIONS COULD BE CALLED IN FOR THE PATIENT SO THEY ARE ABLE TO DISPENSE.

## 2024-08-23 RX ORDER — NICOTINE 21 MG/24HR
1 PATCH, TRANSDERMAL 24 HOURS TRANSDERMAL EVERY 24 HOURS
Qty: 28 PATCH | Refills: 0 | Status: SHIPPED | OUTPATIENT
Start: 2024-08-23

## 2024-08-23 RX ORDER — NICOTINE 21 MG/24HR
1 PATCH, TRANSDERMAL 24 HOURS TRANSDERMAL EVERY 24 HOURS
Qty: 21 PATCH | Refills: 0 | Status: SHIPPED | OUTPATIENT
Start: 2024-08-23

## 2024-08-23 NOTE — TELEPHONE ENCOUNTER
Spoke with pharmacy.   They are unable to get the kit with all the doses in it for the nicotine patches.  I sent over separate prescriptions for each dose per their request.

## 2024-10-04 ENCOUNTER — TELEPHONE (OUTPATIENT)
Dept: OBSTETRICS AND GYNECOLOGY | Facility: CLINIC | Age: 44
End: 2024-10-04
Payer: MEDICAID

## 2024-10-04 NOTE — TELEPHONE ENCOUNTER
Hub staff attempted to follow warm transfer process and was unsuccessful     Caller: Janee Mensah    Relationship to patient: Self    Best call back number: [phone] (home)       Patient is needing: HAS A PERSONAL MEDICAL QUESTION. WOULD LIKE A CALL BACK.

## 2024-10-08 ENCOUNTER — OFFICE VISIT (OUTPATIENT)
Dept: OBSTETRICS AND GYNECOLOGY | Facility: CLINIC | Age: 44
End: 2024-10-08
Payer: MEDICAID

## 2024-10-08 VITALS
BODY MASS INDEX: 20.73 KG/M2 | DIASTOLIC BLOOD PRESSURE: 72 MMHG | HEIGHT: 63 IN | HEART RATE: 73 BPM | WEIGHT: 117 LBS | SYSTOLIC BLOOD PRESSURE: 109 MMHG

## 2024-10-08 DIAGNOSIS — N64.52 NIPPLE DISCHARGE: Primary | ICD-10-CM

## 2024-10-08 DIAGNOSIS — N64.4 BREAST PAIN, LEFT: ICD-10-CM

## 2024-10-08 LAB — PROLACTIN SERPL-MCNC: 12.6 NG/ML (ref 4.79–23.3)

## 2024-10-08 PROCEDURE — 84146 ASSAY OF PROLACTIN: CPT | Performed by: OBSTETRICS & GYNECOLOGY

## 2024-10-08 NOTE — PROGRESS NOTES
"GYN Problem/Follow Up Visit    Chief Complaint   Patient presents with    jericho nipple discharge           HPI  Janee Mensah is a 43 y.o. female, , who presents for greenish brown left nipple d/c and left breast pain which she noticed 6 days ago. States noticed her bra was wet and then when she squeezed her left nipple there was d/c. She has not tried to squeeze it since then. Has not noticed any further spontaneous nipple d/c. Also states noticed her left breast was really tender. Denies masses or skin changes.        Additional OB/GYN History   No LMP recorded. Patient is perimenopausal.  Current contraception: contraceptive methods: Tubal ligation  Desires to: continue contraception  Allergies : Patient has no known allergies.     The additional following portions of the patient's history were reviewed and updated as appropriate: allergies, current medications, past family history, past medical history, past social history, past surgical history, and problem list.    Review of Systems    I have reviewed and agree with the HPI, ROS, and historical information as entered above. Susana Tomas, APRN    Objective   /72   Pulse 73   Ht 160 cm (63\")   Wt 53.1 kg (117 lb)   BMI 20.73 kg/m²     Physical Exam  Vitals reviewed.   Chest:   Breasts:     Breasts are symmetrical.      Right: Normal. No swelling, bleeding, inverted nipple, mass, nipple discharge, skin change or tenderness.      Left: Normal. No swelling, bleeding, inverted nipple, mass, nipple discharge, skin change or tenderness.      Comments: No nipple d/c or tenderness noted today during exam.  Lymphadenopathy:      Upper Body:      Right upper body: No supraclavicular, axillary or pectoral adenopathy.      Left upper body: No supraclavicular, axillary or pectoral adenopathy.   Skin:     General: Skin is warm and dry.   Neurological:      Mental Status: She is alert and oriented to person, place, and time.            Assessment and " Plan    Diagnoses and all orders for this visit:    1. Nipple discharge (Primary)  -     Mammo Diagnostic Digital Tomosynthesis Bilateral With CAD; Future  -     US Breast Left Limited; Future  -     Prolactin    2. Breast pain, left  -     Mammo Diagnostic Digital Tomosynthesis Bilateral With CAD; Future  -     US Breast Left Limited; Future  -     Prolactin    Will check prolactin level and imaging. Recent tsh was normal. F/u will be based on results and pt's sx.     Counseling:  She understands the importance of having the above orders performed in a timely fashion.  She is encouraged to review her results online and/or contact or office if she has questions.     Follow Up:  Return for imaging f/u.      ANGY Chang  10/08/2024

## 2024-10-28 ENCOUNTER — HOSPITAL ENCOUNTER (OUTPATIENT)
Dept: MAMMOGRAPHY | Facility: HOSPITAL | Age: 44
Discharge: HOME | End: 2024-10-28
Payer: MEDICAID

## 2024-10-28 ENCOUNTER — HOSPITAL ENCOUNTER (OUTPATIENT)
Dept: ULTRASOUND IMAGING | Facility: HOSPITAL | Age: 44
Discharge: HOME | End: 2024-10-28
Payer: MEDICAID

## 2024-10-28 DIAGNOSIS — N64.52 NIPPLE DISCHARGE: ICD-10-CM

## 2024-10-28 DIAGNOSIS — N64.4 BREAST PAIN, LEFT: ICD-10-CM

## 2024-10-28 PROCEDURE — 77066 DX MAMMO INCL CAD BI: CPT

## 2024-10-28 PROCEDURE — G0279 TOMOSYNTHESIS, MAMMO: HCPCS

## 2024-10-28 PROCEDURE — 76642 ULTRASOUND BREAST LIMITED: CPT

## 2024-11-04 DIAGNOSIS — N64.52 NIPPLE DISCHARGE: Primary | ICD-10-CM

## 2024-12-02 NOTE — PROGRESS NOTES
"Chief Complaint   Patient presents with    Annual Exam       HPI:   44 y.o. . Presents for well woman exam. Contraception:  Tubal ligation  Menses:   none for over 3 months, mild hot flashes  Pain:  None  Last pap: abnormal IgP, Aptima HPV (2023 14:01)  Complaints: Pt has no complaints today.  Patient reports that she is not currently experiencing any symptoms of urinary incontinence.     Past Medical History:   Diagnosis Date    Abnormal Pap smear of cervix     Anxiety and depression     Cervical cancer, FIGO stage I     HPV (human papilloma virus) infection       Past Surgical History:   Procedure Laterality Date    CERVICAL CONE BIOPSY      Metzinger, cervical dysplasia    LAPAROSCOPIC TUBAL LIGATION      does not recall the year.      Family History   Problem Relation Age of Onset    Breast cancer Neg Hx     Ovarian cancer Neg Hx     Uterine cancer Neg Hx     Prostate cancer Neg Hx     Colon cancer Neg Hx      Allergies as of 2024    (No Known Allergies)        PCP: does manage PMHx and preventative labs    /81   Pulse 82   Ht 160 cm (63\")   Wt 54.4 kg (120 lb)   Breastfeeding No   BMI 21.26 kg/m²     PHYSICAL EXAM: Chaperone present   General- NAD, alert and oriented, appropriate  Psych- Normal mood, good memory  Neck- No masses, no thyroid enlargement  Lymphatic- No palpable neck, axillary, or groin nodes  CV- Regular rhythm, no murmurs  Resp- CTA to bases, no wheezes  Abdomen- Soft, non distended, non tender, no masses  Breast left-  Bilaterally symmetrical, no masses, non tender, no nipple discharge  Breast right- Bilaterally symmetrical, no masses, non tender, no nipple discharge  External genitalia- Normal female, no lesions  Urethra/meatus- Normal, no masses, non tender, no prolapse  Bladder- Normal, no masses, non tender, no prolapse  Vagina- Mild atrophy, no lesions, no discharge, no prolapse  Cvx- Normal, no lesions, no discharge, No cervical motion tenderness  Uterus- " Normal size, shape & consistency.  Non tender, mobile.  Adnexa- No mass, non tender  Anus/Rectum/Perineum- Not performed  Ext- No edema, no cyanosis    Skin- No lesions, no rashes, no acanthosis nigricans      ASSESSMENT and PLAN:    Diagnoses and all orders for this visit:    1. Well woman exam (Primary)  -     POC Pregnancy, Urine  -     IgP, Aptima HPV    2. Screen for STD (sexually transmitted disease)  -     Gardnerella vaginalis, Trichomonas vaginalis, Candida albicans, DNA - Swab, Vagina  -     Chlamydia trachomatis, Neisseria gonorrhoeae, PCR - Swab, Cervix  -     RPR, Rfx Qn RPR / Confirm TP  -     Hepatitis B Surface Antigen  -     Hepatitis C Antibody  -     HIV-1 / O / 2 Ag / Antibody 4th Generation      HCG, Urine, QL   Date Value Ref Range Status   12/16/2024 Negative Negative Final       Preventative:   BREAST HEALTH- Monthly self breast exam importance and how to reviewed. MMG and/or MRI (prn) reviewed per society guidelines and her individual history. Screening Imaging: follow up scheduled  CERVICAL CANCER Screening- Reviewed current ASCCP guidelines for screening w and wo cotest HPV, age specific.  Screen: Updated today  COLON CANCER Screening- Reviewed current medical society guidelines and options.  Screen:  Pt will call to schedule  SEXUAL HEALTH: STD screening desired.  Ordered,  Safe sex and condoms  BONE HEALTH- Reviewed current medical society guidelines and options for testing, calcium and vit D intake.  Weight bearing exercise.  DEXA: Not medically needed  VACCINATIONS Recommended: COVID and booster PRN, Flu annually  Importance discussed, risk being unvaccinated reviewed.  Questions answered  Genetic testing: Does not qualify.  Smoking status- NON SMOKER  Follow up PCP/Specialist PMHx and Labs  TRACK MENSES, RTO if <q21d, >7d long, heavy or painful.     She understands the importance of having any ordered tests to be performed in a timely fashion.  The risks of not performing them  include, but are not limited to, advanced cancer stages, bone loss from osteoporosis and/or subsequent increase in morbidity and/or mortality.  She is encouraged to review her results online and/or contact or office if she has questions.       Follow Up:  Return in about 1 year (around 12/16/2025).        Deedee Arce, APRN  12/16/2024

## 2024-12-13 ENCOUNTER — OFFICE VISIT (OUTPATIENT)
Dept: INTERNAL MEDICINE | Facility: CLINIC | Age: 44
End: 2024-12-13
Payer: MEDICAID

## 2024-12-13 VITALS
BODY MASS INDEX: 21.26 KG/M2 | OXYGEN SATURATION: 97 % | HEIGHT: 63 IN | WEIGHT: 120 LBS | DIASTOLIC BLOOD PRESSURE: 70 MMHG | TEMPERATURE: 97.6 F | HEART RATE: 97 BPM | SYSTOLIC BLOOD PRESSURE: 103 MMHG

## 2024-12-13 DIAGNOSIS — R09.81 NASAL CONGESTION: ICD-10-CM

## 2024-12-13 DIAGNOSIS — G47.00 INSOMNIA, UNSPECIFIED TYPE: Primary | Chronic | ICD-10-CM

## 2024-12-13 DIAGNOSIS — H66.91 RIGHT ACUTE OTITIS MEDIA: ICD-10-CM

## 2024-12-13 PROCEDURE — 99214 OFFICE O/P EST MOD 30 MIN: CPT | Performed by: STUDENT IN AN ORGANIZED HEALTH CARE EDUCATION/TRAINING PROGRAM

## 2024-12-13 RX ORDER — TRAZODONE HYDROCHLORIDE 100 MG/1
200 TABLET ORAL NIGHTLY
Qty: 60 TABLET | Refills: 2 | Status: SHIPPED | OUTPATIENT
Start: 2024-12-13

## 2024-12-13 RX ORDER — PSEUDOEPHEDRINE HCL 120 MG/1
120 TABLET, FILM COATED, EXTENDED RELEASE ORAL EVERY 12 HOURS
Qty: 30 TABLET | Refills: 0 | Status: SHIPPED | OUTPATIENT
Start: 2024-12-13

## 2024-12-13 RX ORDER — PSEUDOEPHEDRINE HCL 120 MG/1
120 TABLET, FILM COATED, EXTENDED RELEASE ORAL EVERY 12 HOURS
COMMUNITY
End: 2024-12-13 | Stop reason: SDUPTHER

## 2024-12-13 NOTE — PROGRESS NOTES
"Chief Complaint  Earache and medication concern (Wants to change dose on sleeping medication, wants to see if it can be upped more. )    Subjective          Janee Mensah presents to [facility]  History of Present Illness    Here with two days of bilateral otalgia and drainage.  No fever.  Does wear hearing aid (in right ear only).    Requesting sudafed prescription for nasal congestion.    Requests to go up on trazodone for treatment of her insomnia.    Current Outpatient Medications   Medication Instructions    albuterol sulfate  (90 Base) MCG/ACT inhaler 2 puffs, Inhalation, Every 4 Hours PRN    amoxicillin-clavulanate (AUGMENTIN) 875-125 MG per tablet 1 tablet, Oral, 2 Times Daily    atorvastatin (LIPITOR) 40 mg, Oral, Daily    lamoTRIgine (LAMICTAL) 150 mg, Daily    nicotine (NICODERM CQ) 14 MG/24HR patch 1 patch, Transdermal, Every 24 Hours    nicotine (NICODERM CQ) 21 MG/24HR patch 1 patch, Transdermal, Every 24 Hours    nicotine (NICODERM CQ) 7 MG/24HR patch 1 patch, Transdermal, Every 24 Hours    pseudoephedrine (SUDAFED) 120 mg, Oral, Every 12 Hours    traZODone (DESYREL) 200 mg, Oral, Nightly       The following portions of the patient's history were reviewed and updated as appropriate: allergies, current medications, past family history, past medical history, past social history, past surgical history, and problem list.    Objective   Vital Signs:   /70 (BP Location: Left arm, Patient Position: Sitting, Cuff Size: Adult)   Pulse 97   Temp 97.6 °F (36.4 °C) (Temporal)   Ht 160 cm (63\")   Wt 54.4 kg (120 lb)   SpO2 97%   BMI 21.26 kg/m²     BP Readings from Last 3 Encounters:   12/13/24 103/70   10/08/24 109/72   08/16/24 123/80     Wt Readings from Last 3 Encounters:   12/13/24 54.4 kg (120 lb)   10/08/24 53.1 kg (117 lb)   08/16/24 52.2 kg (115 lb)     BMI is within normal parameters. No other follow-up for BMI required.   "   Physical Exam  Vitals reviewed.   Constitutional:       General: She is not in acute distress.     Appearance: Normal appearance. She is not ill-appearing, toxic-appearing or diaphoretic.   HENT:      Head: Normocephalic and atraumatic.      Right Ear: Ear canal and external ear normal.      Left Ear: Tympanic membrane, ear canal and external ear normal.      Ears:      Comments: Purulence noted under right TM  Eyes:      Conjunctiva/sclera: Conjunctivae normal.   Cardiovascular:      Rate and Rhythm: Normal rate and regular rhythm.      Pulses: Normal pulses.      Heart sounds: Normal heart sounds. No murmur heard.     No friction rub. No gallop.   Pulmonary:      Effort: Pulmonary effort is normal. No respiratory distress.      Breath sounds: Normal breath sounds. No stridor. No wheezing, rhonchi or rales.   Chest:      Chest wall: No tenderness.   Abdominal:      General: Abdomen is flat.      Palpations: Abdomen is soft. There is no mass.      Tenderness: There is no abdominal tenderness.   Musculoskeletal:      Right lower leg: No edema.      Left lower leg: No edema.   Skin:     General: Skin is warm and dry.   Neurological:      General: No focal deficit present.      Mental Status: She is alert. Mental status is at baseline.   Psychiatric:         Behavior: Behavior normal.         Thought Content: Thought content normal.         Judgment: Judgment normal.          Result Review :   The following data was reviewed by: Arash Block MD on 12/13/2024:  Common labs          2/2/2024    14:45 5/15/2024    15:44 8/16/2024    15:57   Common Labs   Glucose 73  86  80    BUN 9  9  4    Creatinine 0.73  0.84  0.64    Sodium 138  138  137    Potassium 4.3  4.4  3.8    Chloride 102  103  101    Calcium 9.7  9.6  9.9    Albumin 4.5  4.8  4.7    Total Bilirubin <0.2  <0.2  0.2    Alkaline Phosphatase 63  74  76    AST (SGOT) 16  16  19    ALT (SGPT) 18  13  9    WBC 8.38  10.48  8.67    Hemoglobin 14.6  15.6  14.5     Hematocrit 43.9  47.8  43.4    Platelets 386  392  325    Total Cholesterol 236  240  215    Triglycerides 100  105  69    HDL Cholesterol 54  61  55    LDL Cholesterol  164  161  148             Lab Results   Component Value Date    COVID19 Detected (C) 01/22/2022    POCPREGUR Negative 03/25/2024    BILIRUBINUR Negative 05/29/2023            Assessment and Plan    Diagnoses and all orders for this visit:    1. Insomnia, unspecified type (Primary)  Comments:  inadequately controlled  Orders:  -     traZODone (DESYREL) 100 MG tablet; Take 2 tablets by mouth Every Night.  Dispense: 60 tablet; Refill: 2    2. Right acute otitis media  -     amoxicillin-clavulanate (AUGMENTIN) 875-125 MG per tablet; Take 1 tablet by mouth 2 (Two) Times a Day for 7 days.  Dispense: 14 tablet; Refill: 0    3. Nasal congestion  -     pseudoephedrine (SUDAFED) 120 MG 12 hr tablet; Take 1 tablet by mouth Every 12 (Twelve) Hours.  Dispense: 30 tablet; Refill: 0      Right AOM:  -antibiotic sent    Insomnia:  -will increase trazodone from 150 mg to 200 mg    Medications Discontinued During This Encounter   Medication Reason    traZODone (DESYREL) 150 MG tablet     pseudoephedrine (SUDAFED) 120 MG 12 hr tablet Reorder          Follow Up   Return if symptoms worsen or fail to improve.  Patient was given instructions and counseling regarding her condition or for health maintenance advice. Please see specific information pulled into the AVS if appropriate.       Arash Block MD  12/13/24  11:17 EST

## 2024-12-16 ENCOUNTER — OFFICE VISIT (OUTPATIENT)
Dept: OBSTETRICS AND GYNECOLOGY | Facility: CLINIC | Age: 44
End: 2024-12-16
Payer: MEDICAID

## 2024-12-16 ENCOUNTER — TELEPHONE (OUTPATIENT)
Dept: INTERNAL MEDICINE | Facility: CLINIC | Age: 44
End: 2024-12-16
Payer: MEDICAID

## 2024-12-16 VITALS
DIASTOLIC BLOOD PRESSURE: 81 MMHG | WEIGHT: 120 LBS | HEART RATE: 82 BPM | BODY MASS INDEX: 21.26 KG/M2 | SYSTOLIC BLOOD PRESSURE: 125 MMHG | HEIGHT: 63 IN

## 2024-12-16 DIAGNOSIS — Z01.419 WELL WOMAN EXAM: Primary | ICD-10-CM

## 2024-12-16 DIAGNOSIS — Z11.3 SCREEN FOR STD (SEXUALLY TRANSMITTED DISEASE): ICD-10-CM

## 2024-12-16 LAB
B-HCG UR QL: NEGATIVE
C TRACH RRNA CVX QL NAA+PROBE: NOT DETECTED
CANDIDA SPECIES: NEGATIVE
EXPIRATION DATE: NORMAL
GARDNERELLA VAGINALIS: POSITIVE
INTERNAL NEGATIVE CONTROL: NORMAL
INTERNAL POSITIVE CONTROL: NORMAL
Lab: NORMAL
N GONORRHOEA RRNA SPEC QL NAA+PROBE: NOT DETECTED
T VAGINALIS DNA VAG QL PROBE+SIG AMP: NEGATIVE

## 2024-12-16 PROCEDURE — 87624 HPV HI-RISK TYP POOLED RSLT: CPT | Performed by: NURSE PRACTITIONER

## 2024-12-16 PROCEDURE — 87480 CANDIDA DNA DIR PROBE: CPT | Performed by: NURSE PRACTITIONER

## 2024-12-16 PROCEDURE — 87510 GARDNER VAG DNA DIR PROBE: CPT | Performed by: NURSE PRACTITIONER

## 2024-12-16 PROCEDURE — G0123 SCREEN CERV/VAG THIN LAYER: HCPCS | Performed by: NURSE PRACTITIONER

## 2024-12-16 PROCEDURE — 87491 CHLMYD TRACH DNA AMP PROBE: CPT | Performed by: NURSE PRACTITIONER

## 2024-12-16 PROCEDURE — 87660 TRICHOMONAS VAGIN DIR PROBE: CPT | Performed by: NURSE PRACTITIONER

## 2024-12-16 PROCEDURE — 87591 N.GONORRHOEAE DNA AMP PROB: CPT | Performed by: NURSE PRACTITIONER

## 2024-12-16 NOTE — TELEPHONE ENCOUNTER
Spoke with patient. Verified   Patient said that Dr. Block did not increase her trazodone at her appt like she requested and I explained her did that he increased it from 150 mg to 200 mg but he sent in for her to take 2 tablets of 100 mg. Patient reports she did not read the directions on the bottle but it does say to take 2 tablets.

## 2024-12-16 NOTE — TELEPHONE ENCOUNTER
Caller: Janee Mensah    Relationship: Self    Best call back number: [phone]    What is the best time to reach you: ANY     Who are you requesting to speak with (clinical staff, provider,  specific staff member): CLINICAL     What was the call regarding:PATIENT IS REQUESTING A CALL BACK TO SPEAK WITH A NURSE REGARDING MEDICATION traZODone (DESYREL) 100 MG tablet.     Is it okay if the provider responds through MyChart: YES

## 2024-12-17 RX ORDER — METRONIDAZOLE 7.5 MG/G
1 GEL VAGINAL
Qty: 70 G | Refills: 0 | Status: SHIPPED | OUTPATIENT
Start: 2024-12-17 | End: 2024-12-22

## 2024-12-23 LAB
CYTOLOGIST CVX/VAG CYTO: ABNORMAL
CYTOLOGY CVX/VAG DOC CYTO: ABNORMAL
CYTOLOGY CVX/VAG DOC THIN PREP: ABNORMAL
DX ICD CODE: ABNORMAL
HPV I/H RISK 4 DNA CVX QL PROBE+SIG AMP: POSITIVE
Lab: ABNORMAL
OTHER STN SPEC: ABNORMAL
STAT OF ADQ CVX/VAG CYTO-IMP: ABNORMAL
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
## 2025-01-30 ENCOUNTER — PROCEDURE VISIT (OUTPATIENT)
Dept: OBSTETRICS AND GYNECOLOGY | Facility: CLINIC | Age: 45
End: 2025-01-30
Payer: MEDICAID

## 2025-01-30 VITALS
DIASTOLIC BLOOD PRESSURE: 83 MMHG | WEIGHT: 122 LBS | HEIGHT: 63 IN | SYSTOLIC BLOOD PRESSURE: 121 MMHG | BODY MASS INDEX: 21.62 KG/M2 | HEART RATE: 93 BPM

## 2025-01-30 DIAGNOSIS — B97.7 HIGH RISK HUMAN PAPILLOMA VIRUS (HPV) INFECTION OF CERVIX: Primary | ICD-10-CM

## 2025-01-30 DIAGNOSIS — N72 HIGH RISK HUMAN PAPILLOMA VIRUS (HPV) INFECTION OF CERVIX: Primary | ICD-10-CM

## 2025-01-30 LAB
B-HCG UR QL: NEGATIVE
EXPIRATION DATE: NORMAL
INTERNAL NEGATIVE CONTROL: NORMAL
INTERNAL POSITIVE CONTROL: NORMAL
Lab: NORMAL

## 2025-01-30 PROCEDURE — 88305 TISSUE EXAM BY PATHOLOGIST: CPT | Performed by: OBSTETRICS & GYNECOLOGY

## 2025-02-18 ENCOUNTER — TELEPHONE (OUTPATIENT)
Dept: INTERNAL MEDICINE | Facility: CLINIC | Age: 45
End: 2025-02-18

## 2025-02-18 NOTE — TELEPHONE ENCOUNTER
"Attempted to contact. Left message to call the office back    Relay   HUB ok to read/advise  \"We are needing to reschedule patient's appointment today due to PCP being out ill.\"        "

## 2025-02-24 NOTE — PROGRESS NOTES
Chief Complaint  Hyperlipidemia (6 month follow-up), Mood Disorder (6 month follow-up/), and Nicotine Dependence (6 month follow-up/)    Subjective          Janee Mensah presents to [facility]  Depression  Presents for initial visit. Symptoms include decreased concentration, depressed mood, excessive worry, insomnia, irritability, nervousness/anxiety and malaise/fatigue. Patient is not experiencing: compulsions, confusion, dry mouth, hyperventilation, impotence, muscle tension, obsessions, palpitations, panic, shortness of breath, suicidal ideas, thoughts of death, chest pain, feeling of choking, dizziness and nausea.  Her past medical history is significant for depression.   Vaginal Discharge  The patient's primary symptoms include a genital odor and vaginal discharge. The patient's pertinent negatives include no genital itching, genital lesions, genital rash, missed menses, pelvic pain or vaginal bleeding. This is a new problem. Episode onset: 1 week. Associated symptoms include frequency. Pertinent negatives include no abdominal pain, anorexia, back pain, chills, constipation, diarrhea, discolored urine, dysuria, fever, flank pain, headaches, hematuria, joint pain, joint swelling, nausea, painful intercourse, rash, sore throat, urgency or vomiting.       History of Present Illness      Anxiety  Presents for follow up visit. Symptoms include depressed mood, excessive worry, irritability, nervous/anxious behavior and panic. Patient reports no chest pain, compulsions, confusion, decreased concentration, dizziness, dry mouth, feeling of choking, hyperventilation, impotence, insomnia, malaise, muscle tension, nausea, obsessions, palpitations, restlessness, shortness of breath or suicidal ideas.     Risk factors:  passed away 6 years ago  Treatments tried:  hydroxyzine and lamictal        Insomnia  This is a chronic problem. Pertinent negatives include no  "abdominal pain, anorexia, arthralgias, change in bowel habit, chest pain, chills, congestion, coughing, diaphoresis, fatigue, fever, headaches, joint swelling, myalgias, nausea, neck pain, numbness, rash, sore throat, swollen glands, urinary symptoms, vertigo, visual change, vomiting or weakness. Treatments tried: trazodone  The treatment provided significant relief.   Current Outpatient Medications   Medication Instructions   • albuterol sulfate  (90 Base) MCG/ACT inhaler 2 puffs, Inhalation, Every 4 Hours PRN   • busPIRone (BUSPAR) 5 mg, Oral, 3 Times Daily   • ciprofloxacin-dexAMETHasone (Ciprodex) 0.3-0.1 % otic suspension 4 drops, Left Ear, 2 Times Daily   • sertraline (ZOLOFT) 50 mg, Oral, Daily   • traZODone (DESYREL) 200 mg, Oral, Nightly       The following portions of the patient's history were reviewed and updated as appropriate: allergies, current medications, past family history, past medical history, past social history, past surgical history, and problem list.    Objective   Vital Signs:   /75 (BP Location: Left arm, Patient Position: Sitting, Cuff Size: Adult)   Pulse 87   Temp 98.7 °F (37.1 °C) (Temporal)   Ht 160 cm (63\")   Wt 54.4 kg (120 lb)   SpO2 93%   BMI 21.26 kg/m²     BP Readings from Last 3 Encounters:   02/25/25 108/75   01/30/25 121/83   12/16/24 125/81     Wt Readings from Last 3 Encounters:   02/25/25 54.4 kg (120 lb)   01/30/25 55.3 kg (122 lb)   12/16/24 54.4 kg (120 lb)     BMI is within normal parameters. No other follow-up for BMI required.     Physical Exam     Appearance: No acute distress, well-nourished  Head: normocephalic, atraumatic  Eyes: extraocular movements intact, no scleral icterus, no conjunctival injection  Ears, Nose, and Throat: external ears normal, nares patent, moist mucous membranes  Cardiovascular: regular rate and rhythm. no murmurs, rubs, or gallops. no edema  Respiratory: breathing comfortably, symmetric chest rise, clear to auscultation " bilaterally. No wheezes, rales, or rhonchi.  Neuro: alert and oriented to time, place, and person. Normal gait  Psych: normal mood and affect     Physical Exam        Result Review :   The following data was reviewed by: ANGY Limon on 02/18/2025:  Common labs          5/15/2024    15:44 8/16/2024    15:57 2/25/2025    11:42   Common Labs   Glucose 86  80  77    BUN 9  4  7    Creatinine 0.84  0.64  0.63    Sodium 138  137  138    Potassium 4.4  3.8  4.6    Chloride 103  101  103    Calcium 9.6  9.9  10.3    Albumin 4.8  4.7  4.5    Total Bilirubin <0.2  0.2  0.2    Alkaline Phosphatase 74  76  76    AST (SGOT) 16  19  26    ALT (SGPT) 13  9  20    WBC 10.48  8.67  8.17    Hemoglobin 15.6  14.5  15.7    Hematocrit 47.8  43.4  47.6    Platelets 392  325  385    Total Cholesterol 240  215  266    Triglycerides 105  69  103    HDL Cholesterol 61  55  55    LDL Cholesterol  161  148  193        Results           Lab Results   Component Value Date    COVID19 Detected (C) 01/22/2022    POCPREGUR Negative 01/30/2025    BILIRUBINUR Negative 02/25/2025            Assessment and Plan    Diagnoses and all orders for this visit:    1. Mixed hyperlipidemia (Primary)  -     CBC w AUTO Differential  -     Comprehensive metabolic panel  -     Lipid panel    2. Tobacco use  -     CBC w AUTO Differential  -     Comprehensive metabolic panel  -     Lipid panel    3. Mood disorder    4. CAROLINA (generalized anxiety disorder)  -     busPIRone (BUSPAR) 5 MG tablet; Take 1 tablet by mouth 3 (Three) Times a Day.  Dispense: 90 tablet; Refill: 1    5. Mild episode of recurrent major depressive disorder  -     sertraline (Zoloft) 50 MG tablet; Take 1 tablet by mouth Daily.  Dispense: 90 tablet; Refill: 1    6. Vaginal discharge  -     Gardnerella vaginalis, Trichomonas vaginalis, Candida albicans, DNA - Swab, Vagina  -     Chlamydia trachomatis, Neisseria gonorrhoeae, Trichomonas vaginalis, PCR - Swab, Urine, Random Void  -      Urinalysis With Microscopic - Urine, Clean Catch  -     Urine Culture - Urine, Urine, Clean Catch    7. Acute swimmer's ear of left side  -     ciprofloxacin-dexAMETHasone (Ciprodex) 0.3-0.1 % otic suspension; Administer 4 drops into the left ear 2 (Two) Times a Day.  Dispense: 7.5 mL; Refill: 0      - initiate zoloft today. BBW discussed.   - initiate buspar for anxiety   - will RX meds based on results for vaginal discharge         Assessment & Plan      There are no discontinued medications.       Follow Up   Return in about 4 weeks (around 3/25/2025) for Anxiety, Depression.  Patient was given instructions and counseling regarding her condition or for health maintenance advice. Please see specific information pulled into the AVS if appropriate.       ANGY Limon  03/06/25  07:52 EST      Patient or patient representative verbalized consent for the use of Ambient Listening during the visit with  ANGY Limon for chart documentation. 3/6/2025  11:05 EST

## 2025-02-25 ENCOUNTER — OFFICE VISIT (OUTPATIENT)
Dept: INTERNAL MEDICINE | Facility: CLINIC | Age: 45
End: 2025-02-25
Payer: MEDICAID

## 2025-02-25 VITALS
HEART RATE: 87 BPM | BODY MASS INDEX: 21.26 KG/M2 | SYSTOLIC BLOOD PRESSURE: 108 MMHG | HEIGHT: 63 IN | OXYGEN SATURATION: 93 % | DIASTOLIC BLOOD PRESSURE: 75 MMHG | TEMPERATURE: 98.7 F | WEIGHT: 120 LBS

## 2025-02-25 DIAGNOSIS — F33.0 MILD EPISODE OF RECURRENT MAJOR DEPRESSIVE DISORDER: ICD-10-CM

## 2025-02-25 DIAGNOSIS — F39 MOOD DISORDER: ICD-10-CM

## 2025-02-25 DIAGNOSIS — Z72.0 TOBACCO USE: ICD-10-CM

## 2025-02-25 DIAGNOSIS — N89.8 VAGINAL DISCHARGE: ICD-10-CM

## 2025-02-25 DIAGNOSIS — F41.1 GAD (GENERALIZED ANXIETY DISORDER): ICD-10-CM

## 2025-02-25 DIAGNOSIS — H60.332 ACUTE SWIMMER'S EAR OF LEFT SIDE: ICD-10-CM

## 2025-02-25 DIAGNOSIS — E78.2 MIXED HYPERLIPIDEMIA: Primary | ICD-10-CM

## 2025-02-25 LAB
BACTERIA UR QL AUTO: NORMAL /HPF
BASOPHILS # BLD AUTO: 0.04 10*3/MM3 (ref 0–0.2)
BASOPHILS NFR BLD AUTO: 0.5 % (ref 0–1.5)
BILIRUB UR QL STRIP: NEGATIVE
CANDIDA SPECIES: NEGATIVE
CHOLEST SERPL-MCNC: 266 MG/DL (ref 0–200)
CLARITY UR: CLEAR
COLOR UR: YELLOW
DEPRECATED RDW RBC AUTO: 40.4 FL (ref 37–54)
EOSINOPHIL # BLD AUTO: 0.1 10*3/MM3 (ref 0–0.4)
EOSINOPHIL NFR BLD AUTO: 1.2 % (ref 0.3–6.2)
ERYTHROCYTE [DISTWIDTH] IN BLOOD BY AUTOMATED COUNT: 11.7 % (ref 12.3–15.4)
GARDNERELLA VAGINALIS: POSITIVE
GLUCOSE UR STRIP-MCNC: NEGATIVE MG/DL
HCT VFR BLD AUTO: 47.6 % (ref 34–46.6)
HDLC SERPL-MCNC: 55 MG/DL (ref 40–60)
HGB BLD-MCNC: 15.7 G/DL (ref 12–15.9)
HGB UR QL STRIP.AUTO: NEGATIVE
HYALINE CASTS UR QL AUTO: NORMAL /LPF
IMM GRANULOCYTES # BLD AUTO: 0.03 10*3/MM3 (ref 0–0.05)
IMM GRANULOCYTES NFR BLD AUTO: 0.4 % (ref 0–0.5)
KETONES UR QL STRIP: NEGATIVE
LDLC SERPL CALC-MCNC: 193 MG/DL (ref 0–100)
LDLC/HDLC SERPL: 3.46 {RATIO}
LEUKOCYTE ESTERASE UR QL STRIP.AUTO: NEGATIVE
LYMPHOCYTES # BLD AUTO: 3.3 10*3/MM3 (ref 0.7–3.1)
LYMPHOCYTES NFR BLD AUTO: 40.4 % (ref 19.6–45.3)
MCH RBC QN AUTO: 31.2 PG (ref 26.6–33)
MCHC RBC AUTO-ENTMCNC: 33 G/DL (ref 31.5–35.7)
MCV RBC AUTO: 94.4 FL (ref 79–97)
MONOCYTES # BLD AUTO: 0.48 10*3/MM3 (ref 0.1–0.9)
MONOCYTES NFR BLD AUTO: 5.9 % (ref 5–12)
NEUTROPHILS NFR BLD AUTO: 4.22 10*3/MM3 (ref 1.7–7)
NEUTROPHILS NFR BLD AUTO: 51.6 % (ref 42.7–76)
NITRITE UR QL STRIP: NEGATIVE
NRBC BLD AUTO-RTO: 0 /100 WBC (ref 0–0.2)
PH UR STRIP.AUTO: 7 [PH] (ref 5–8)
PLATELET # BLD AUTO: 385 10*3/MM3 (ref 140–450)
PMV BLD AUTO: 10.2 FL (ref 6–12)
PROT UR QL STRIP: NEGATIVE
RBC # BLD AUTO: 5.04 10*6/MM3 (ref 3.77–5.28)
RBC # UR STRIP: NORMAL /HPF
REF LAB TEST METHOD: NORMAL
SP GR UR STRIP: <=1.005 (ref 1–1.03)
SQUAMOUS #/AREA URNS HPF: NORMAL /HPF
T VAGINALIS DNA VAG QL PROBE+SIG AMP: NEGATIVE
TRIGL SERPL-MCNC: 103 MG/DL (ref 0–150)
UROBILINOGEN UR QL STRIP: NORMAL
VLDLC SERPL-MCNC: 18 MG/DL (ref 5–40)
WBC # UR STRIP: NORMAL /HPF
WBC NRBC COR # BLD AUTO: 8.17 10*3/MM3 (ref 3.4–10.8)

## 2025-02-25 PROCEDURE — 87591 N.GONORRHOEAE DNA AMP PROB: CPT | Performed by: NURSE PRACTITIONER

## 2025-02-25 PROCEDURE — 1159F MED LIST DOCD IN RCRD: CPT | Performed by: NURSE PRACTITIONER

## 2025-02-25 PROCEDURE — 80053 COMPREHEN METABOLIC PANEL: CPT | Performed by: NURSE PRACTITIONER

## 2025-02-25 PROCEDURE — 87661 TRICHOMONAS VAGINALIS AMPLIF: CPT | Performed by: NURSE PRACTITIONER

## 2025-02-25 PROCEDURE — 87086 URINE CULTURE/COLONY COUNT: CPT | Performed by: NURSE PRACTITIONER

## 2025-02-25 PROCEDURE — 87480 CANDIDA DNA DIR PROBE: CPT | Performed by: NURSE PRACTITIONER

## 2025-02-25 PROCEDURE — 99214 OFFICE O/P EST MOD 30 MIN: CPT | Performed by: NURSE PRACTITIONER

## 2025-02-25 PROCEDURE — 81001 URINALYSIS AUTO W/SCOPE: CPT | Performed by: NURSE PRACTITIONER

## 2025-02-25 PROCEDURE — 80061 LIPID PANEL: CPT | Performed by: NURSE PRACTITIONER

## 2025-02-25 PROCEDURE — 1160F RVW MEDS BY RX/DR IN RCRD: CPT | Performed by: NURSE PRACTITIONER

## 2025-02-25 PROCEDURE — 87510 GARDNER VAG DNA DIR PROBE: CPT | Performed by: NURSE PRACTITIONER

## 2025-02-25 PROCEDURE — 85025 COMPLETE CBC W/AUTO DIFF WBC: CPT | Performed by: NURSE PRACTITIONER

## 2025-02-25 PROCEDURE — 87660 TRICHOMONAS VAGIN DIR PROBE: CPT | Performed by: NURSE PRACTITIONER

## 2025-02-25 PROCEDURE — 87491 CHLMYD TRACH DNA AMP PROBE: CPT | Performed by: NURSE PRACTITIONER

## 2025-02-25 RX ORDER — CIPROFLOXACIN AND DEXAMETHASONE 3; 1 MG/ML; MG/ML
4 SUSPENSION/ DROPS AURICULAR (OTIC) 2 TIMES DAILY
Qty: 7.5 ML | Refills: 0 | Status: SHIPPED | OUTPATIENT
Start: 2025-02-25

## 2025-02-25 RX ORDER — BUSPIRONE HYDROCHLORIDE 5 MG/1
5 TABLET ORAL 3 TIMES DAILY
Qty: 90 TABLET | Refills: 1 | Status: SHIPPED | OUTPATIENT
Start: 2025-02-25

## 2025-02-26 ENCOUNTER — TELEPHONE (OUTPATIENT)
Dept: INTERNAL MEDICINE | Facility: CLINIC | Age: 45
End: 2025-02-26
Payer: MEDICAID

## 2025-02-26 DIAGNOSIS — N76.0 BV (BACTERIAL VAGINOSIS): Primary | ICD-10-CM

## 2025-02-26 DIAGNOSIS — B96.89 BV (BACTERIAL VAGINOSIS): Primary | ICD-10-CM

## 2025-02-26 LAB
ALBUMIN SERPL-MCNC: 4.5 G/DL (ref 3.5–5.2)
ALBUMIN/GLOB SERPL: 1.7 G/DL
ALP SERPL-CCNC: 76 U/L (ref 39–117)
ALT SERPL W P-5'-P-CCNC: 20 U/L (ref 1–33)
ANION GAP SERPL CALCULATED.3IONS-SCNC: 10 MMOL/L (ref 5–15)
AST SERPL-CCNC: 26 U/L (ref 1–32)
BILIRUB SERPL-MCNC: 0.2 MG/DL (ref 0–1.2)
BUN SERPL-MCNC: 7 MG/DL (ref 6–20)
BUN/CREAT SERPL: 11.1 (ref 7–25)
CALCIUM SPEC-SCNC: 10.3 MG/DL (ref 8.6–10.5)
CHLORIDE SERPL-SCNC: 103 MMOL/L (ref 98–107)
CO2 SERPL-SCNC: 25 MMOL/L (ref 22–29)
CREAT SERPL-MCNC: 0.63 MG/DL (ref 0.57–1)
EGFRCR SERPLBLD CKD-EPI 2021: 111 ML/MIN/1.73
GLOBULIN UR ELPH-MCNC: 2.7 GM/DL
GLUCOSE SERPL-MCNC: 77 MG/DL (ref 65–99)
POTASSIUM SERPL-SCNC: 4.6 MMOL/L (ref 3.5–5.2)
PROT SERPL-MCNC: 7.2 G/DL (ref 6–8.5)
SODIUM SERPL-SCNC: 138 MMOL/L (ref 136–145)

## 2025-02-26 RX ORDER — METRONIDAZOLE 500 MG/1
500 TABLET ORAL 2 TIMES DAILY
Qty: 14 TABLET | Refills: 0 | Status: SHIPPED | OUTPATIENT
Start: 2025-02-26 | End: 2025-03-05

## 2025-02-26 NOTE — TELEPHONE ENCOUNTER
Name: Janee Mensah    Relationship: Self      HUB PROVIDED THE RELAY MESSAGE FROM THE OFFICE   PATIENT VOICED UNDERSTANDING AND HAS NO FURTHER QUESTIONS AT THIS TIME    ADDITIONAL INFORMATION: PATIENT STATES SHE DOES NOT WISH TO START CHOLESTEROL MEDICATION AT THIS TIME.

## 2025-02-26 NOTE — TELEPHONE ENCOUNTER
"Attempted to contact patient. Left message to call the office back.    Relay   Hub ok to read/advise  \"The 10-year ASCVD risk score (Arnaldo FOSTER, et al., 2019) is: 3.3%        Patient's cholesterol is elevated. It is recommended she be on cholesterol lowering medication. Is she agreeable?   If so will send in lipitor 40 mg      Positive for bacterial vaginosis. This is not a sexually transmitted disease. It is an overgrowth of bacteria within the vagina.   I am sending in flagyl (which is an antibiotic) x 7 days.   Avoid intercourse until completion of the antibiotic.   Also avoid alcohol while taking the Flagyl. \"        "

## 2025-02-26 NOTE — TELEPHONE ENCOUNTER
----- Message from Carlee Victor Manuel sent at 2/26/2025  8:01 AM EST -----  The 10-year ASCVD risk score (Arnaldo FOSTER, et al., 2019) is: 3.3%      Patient's cholesterol is elevated. It is recommended she be on cholesterol lowering medication. Is she agreeable?   If so will send in lipitor 40 mg     Positive for bacterial vaginosis. This is not a sexually transmitted disease. It is an overgrowth of bacteria within the vagina.   I am sending in flagyl (which is an antibiotic) x 7 days.   Avoid intercourse until completion of the antibiotic.   Also avoid alcohol while taking the Flagyl.

## 2025-02-27 LAB — BACTERIA SPEC AEROBE CULT: NO GROWTH

## 2025-02-28 LAB
C TRACH RRNA SPEC QL NAA+PROBE: NEGATIVE
N GONORRHOEA RRNA SPEC QL NAA+PROBE: NEGATIVE
T VAGINALIS RRNA SPEC QL NAA+PROBE: NEGATIVE

## 2025-03-04 NOTE — PROGRESS NOTES
"GYN Visit    Chief Complaint   Patient presents with    Colpo FU       HPI:   44 y.o. here to review the results of colposcopic directed biopsies.  Patient is a smoker.  She had a Pap smear in  ASCUS HPV positive.  Repeat Pap smear 2023 normal but HPV positive.  Patient underwent colposcopy and colposcopic directed biopsies in 2023.  ECC was negative but the biopsies revealed JHOANA-1.  Patient then told to follow-up she had a repeat Pap smear in 2024 again normal but HPV positive.  Patient then referred for repeat colposcopy she is here to discuss those results today      History: PMHx, Meds, Allergies, PSHx, Social Hx, and POBHx all reviewed and updated.    PHYSICAL EXAM:  /81   Pulse 77   Ht 160 cm (63\")   Wt 55.8 kg (123 lb)   BMI 21.79 kg/m²   General- NAD, alert and oriented, appropriate  Psych- Normal mood, good memory            Tissue Pathology Exam (2025 13:56)    ASSESSMENT AND PLAN:  Diagnoses and all orders for this visit:    1. Mild cervical dysplasia, histologically confirmed (Primary)  -     Case Request; Standing  -     Case Request    2. Tobacco use    Other orders  -     Follow Anesthesia Guidelines / Protocol; Standing  -     Verify / Perform Chlorhexidine Skin Prep; Standing  -     Place Sequential Compression Device; Standing  -     Maintain Sequential Compression Device; Standing  -     CBC & Differential; Standing    Patient with colposcopic directed biopsies revealing persistent positive ECC and cervical biopsies with JHOANA-1.  Patient has refused to discontinue smoking.  She has therefore failed observational management and recommend cold knife conization of the cervix: The patient was counseled to the risks and benefits of this procedure to include infection, bleeding, damage to internal organs, bowels, bladder, blood vessels, other internal organs requiring additional surgery to repair or remove any damaged structures.   The patient was also " informed of the possibility of failure to correct her dysplasia as well as persistent dysplasia requiring additional surgery.  The patient was given the opportunity to ask questions and her questions were answered to her satisfaction.         Follow Up:  No follow-ups on file.          Kristel Galeas DO  03/06/2025    [facility] [place] [facility]  [facility] [place] [address]  Dept: [phone]  Dept Fax: [phone]  Loc: [phone]  Loc Fax: [phone]

## 2025-03-06 ENCOUNTER — OFFICE VISIT (OUTPATIENT)
Dept: OBSTETRICS AND GYNECOLOGY | Facility: CLINIC | Age: 45
End: 2025-03-06
Payer: MEDICAID

## 2025-03-06 VITALS
WEIGHT: 123 LBS | SYSTOLIC BLOOD PRESSURE: 114 MMHG | DIASTOLIC BLOOD PRESSURE: 81 MMHG | HEIGHT: 63 IN | HEART RATE: 77 BPM | BODY MASS INDEX: 21.79 KG/M2

## 2025-03-06 DIAGNOSIS — N87.0 MILD CERVICAL DYSPLASIA, HISTOLOGICALLY CONFIRMED: Primary | ICD-10-CM

## 2025-03-06 DIAGNOSIS — Z72.0 TOBACCO USE: ICD-10-CM

## 2025-03-06 PROCEDURE — 1160F RVW MEDS BY RX/DR IN RCRD: CPT | Performed by: OBSTETRICS & GYNECOLOGY

## 2025-03-06 PROCEDURE — 99214 OFFICE O/P EST MOD 30 MIN: CPT | Performed by: OBSTETRICS & GYNECOLOGY

## 2025-03-06 PROCEDURE — 1159F MED LIST DOCD IN RCRD: CPT | Performed by: OBSTETRICS & GYNECOLOGY

## 2025-03-27 NOTE — PRE-PROCEDURE INSTRUCTIONS
PATIENT INSTRUCTED TO BE:    - NOTHING TO EAT AFTER MIDNIGHT OR CHEW, EXCEPT CAN HAVE CLEAR LIQUIDS 2 HOURS PRIOR TO SURGERY ARRIVAL TIME , NO MORE THAN 8 OZ. (NOTHING RED)     - TO HOLD ALL VITAMINS, SUPPLEMENTS, NSAIDS FOR ONE WEEK PRIOR TO THEIR SURGICAL PROCEDURE        - BATHING INSTRUCTIONS GIVEN    INSTRUCTED NO LOTIONS, JEWELRY, PIERCINGS,  NAIL POLISH, OR DEODORANT DAY OF SURGERY        -INSTRUCTED TO TAKE THE FOLLOWING MEDICATIONS THE DAY OF SURGERY WITH SIPS OF WATER:   If needed Albuterol inhaler        - DO NOT BRING ANY MEDICATIONS WITH YOU TO THE HOSPITAL THE DAY OF SURGERY, EXCEPT IF USE INHALERS. BRING INHALERS DAY OF SURGERY       - BRING CPAP OR BIPAP TO THE HOSPITAL ONLY IF YOU ARE SPENDING THE NIGHT    - DO NOT SMOKE OR VAPE 24 HOURS PRIOR TO PROCEDURE PER ANESTHESIA REQUEST     -MAKE SURE YOU HAVE A RIDE HOME OR SOMEONE TO STAY WITH YOU THE DAY OF THE PROCEDURE AFTER YOU GO HOME     - FOLLOW ANY OTHER INSTRUCTIONS GIVEN TO YOU BY YOUR SURGEON'S OFFICE.      COME TO [facility], [facility] FLOOR. CHECK IN AT THE DESK FOR REGISTRATION/SURGERY    - YOU WILL RECEIVE A PHONE CALL THE DAY PRIOR TO SURGERY BETWEEN 1PM AND 4 PM WITH ARRIVAL TIME, IF YOUR SURGERY IS ON A MONDAY YOU WILL RECEIVE A CALL THE FRIDAY PRIOR TO SURGERY DATE    - BRING CASH OR CREDIT CARD FOR COPAYMENT OF MEDICATIONS AFTER SURGERY IF YOU USE THE HOSPITAL PHARMACY (MEDS TO BED)    - PREADMISSION TESTING NURSE WHITNEY MATTHEW [phone] IF HAVE ANY QUESTIONS     -PATIENT PROVIDED THE NUMBER FOR PREOP SURGICAL DEPT IF HAD QUESTIONS AFTER HOURS PRIOR TO SURGERY [phone]).  INFORMED PT IF NO ANSWER, LEAVE A MESSAGE AND SOMEONE WILL RETURN THEIR CALL       PATIENT VERBALIZED UNDERSTANDING

## 2025-03-28 PROCEDURE — S0260 H&P FOR SURGERY: HCPCS | Performed by: OBSTETRICS & GYNECOLOGY

## 2025-03-29 NOTE — H&P
[facility]   HISTORY AND PHYSICAL    Patient Name:Janee Mensah  : 1980  MRN: [id number]  Primary Care Physician: Carlee Rush APRN  Date of admission: (Not on file)    Subjective   Subjective     Chief Complaint: Here for surgery    History of Present Illness   Janee Mensah is a 44 y.o. female  here for conization of the cervix.  Patient is a smoker.  She had a Pap smear in  ASCUS HPV positive.  Repeat Pap smear 2023 normal but HPV positive.  Patient underwent colposcopy and colposcopic directed biopsies in 2023.  ECC was negative but the biopsies revealed JHOANA-1.  Patient then told to follow-up she had a repeat Pap smear in 2024 again normal but HPV positive.  Colposcopic directed biopsies reveal a positive ECC with JHOANA-1 and cervical biopsy also containing JHOANA-1.  Patient recommended to proceed with conization of the cervix.    Review of Systems   Constitutional: Negative.    HENT: Negative.     Eyes: Negative.    Respiratory: Negative.     Cardiovascular: Negative.    Gastrointestinal: Negative.    Endocrine: Negative.    Genitourinary: Negative.    Musculoskeletal: Negative.    Skin: Negative.    Allergic/Immunologic: Negative.    Neurological: Negative.    Hematological: Negative.    Psychiatric/Behavioral: Negative.           Personal History     Past Medical History:   Diagnosis Date    Abnormal Pap smear of cervix     Anxiety and depression     Cervical cancer, FIGO stage I     HPV (human papilloma virus) infection        Past Surgical History:   Procedure Laterality Date    CERVICAL CONE BIOPSY      Metzinger, cervical dysplasia    LAPAROSCOPIC TUBAL LIGATION      does not recall the year.       Family History: Her family history is not on file.     Social History: She  reports that she has been smoking cigarettes. She has a 30 pack-year smoking history. She has been exposed to tobacco smoke. She has never used smokeless  tobacco. She reports that she does not currently use alcohol. She reports that she does not use drugs.    Home Medications:  albuterol sulfate HFA and traZODone    Allergies:  She has no known allergies.    Objective    Objective     Vitals:         Physical Exam  Vitals and nursing note reviewed. Exam conducted with a chaperone present.   Constitutional:       Appearance: Normal appearance.   HENT:      Head: Normocephalic.   Cardiovascular:      Rate and Rhythm: Normal rate and regular rhythm.      Pulses: Normal pulses.      Heart sounds: Normal heart sounds.   Pulmonary:      Effort: Pulmonary effort is normal.      Breath sounds: Normal breath sounds.   Abdominal:      General: Bowel sounds are normal.      Palpations: Abdomen is soft.   Genitourinary:     Comments: Nml female external genitalia.  Cervix is parous. Uterus axial normal size shape and consistency.  No adnexal masses are appreciated    Musculoskeletal:         General: Normal range of motion.      Cervical back: Normal range of motion.   Skin:     General: Skin is warm and dry.   Neurological:      General: No focal deficit present.      Mental Status: She is alert and oriented to person, place, and time.   Psychiatric:         Mood and Affect: Mood normal.         Behavior: Behavior normal.          Result Review    Result Review:  I have personally reviewed the results from the time of this admission to 3/28/2025 20:34 EDT and agree with these findings:  []  Laboratory list / accordion  []  Microbiology  []  Radiology  []  EKG/Telemetry   []  Cardiology/Vascular   []  Pathology  []  Old records  []  Other:  Most notable findings include:        Assessment & Plan   Assessment / Plan     Brief Patient Summary:  Janee Mensah is a 44 y.o. female here for conization of the cervix    Active Hospital Problems:  Active Hospital Problems    Diagnosis     **Mild cervical dysplasia, histologically confirmed      Plan:    cold knife conization of  the cervix: The patient was counseled to the risks and benefits of this procedure to include infection, bleeding, damage to internal organs, bowels, bladder, blood vessels, other internal organs requiring additional surgery to repair or remove any damaged structures.   The patient was also informed of the possibility of failure to correct her dysplasia as well as persistent dysplasia requiring additional surgery.  The patient was given the opportunity to ask questions and her questions were answered to her satisfaction.     VTE Prophylaxis:  No VTE prophylaxis order currently exists.        Kristel Galeas, DO

## 2025-04-01 ENCOUNTER — ANESTHESIA EVENT (OUTPATIENT)
Dept: PERIOP | Facility: HOSPITAL | Age: 45
End: 2025-04-01
Payer: MEDICAID

## 2025-04-01 ENCOUNTER — HOSPITAL ENCOUNTER (OUTPATIENT)
Facility: HOSPITAL | Age: 45
Setting detail: HOSPITAL OUTPATIENT SURGERY
Discharge: HOME | End: 2025-04-01
Attending: OBSTETRICS & GYNECOLOGY | Admitting: OBSTETRICS & GYNECOLOGY
Payer: MEDICAID

## 2025-04-01 ENCOUNTER — ANESTHESIA (OUTPATIENT)
Dept: PERIOP | Facility: HOSPITAL | Age: 45
End: 2025-04-01
Payer: MEDICAID

## 2025-04-01 VITALS
OXYGEN SATURATION: 95 % | WEIGHT: 122.58 LBS | RESPIRATION RATE: 16 BRPM | BODY MASS INDEX: 19.7 KG/M2 | HEART RATE: 77 BPM | TEMPERATURE: 97.8 F | SYSTOLIC BLOOD PRESSURE: 136 MMHG | HEIGHT: 66 IN | DIASTOLIC BLOOD PRESSURE: 80 MMHG

## 2025-04-01 DIAGNOSIS — N87.0 MILD CERVICAL DYSPLASIA, HISTOLOGICALLY CONFIRMED: ICD-10-CM

## 2025-04-01 LAB
BASOPHILS # BLD AUTO: 0.04 10*3/MM3 (ref 0–0.2)
BASOPHILS NFR BLD AUTO: 0.5 % (ref 0–1.5)
DEPRECATED RDW RBC AUTO: 41.1 FL (ref 37–54)
EOSINOPHIL # BLD AUTO: 0.12 10*3/MM3 (ref 0–0.4)
EOSINOPHIL NFR BLD AUTO: 1.6 % (ref 0.3–6.2)
ERYTHROCYTE [DISTWIDTH] IN BLOOD BY AUTOMATED COUNT: 12.5 % (ref 12.3–15.4)
HCT VFR BLD AUTO: 44.4 % (ref 34–46.6)
HGB BLD-MCNC: 15 G/DL (ref 12–15.9)
IMM GRANULOCYTES # BLD AUTO: 0.02 10*3/MM3 (ref 0–0.05)
IMM GRANULOCYTES NFR BLD AUTO: 0.3 % (ref 0–0.5)
LYMPHOCYTES # BLD AUTO: 2.6 10*3/MM3 (ref 0.7–3.1)
LYMPHOCYTES NFR BLD AUTO: 33.7 % (ref 19.6–45.3)
MCH RBC QN AUTO: 30.9 PG (ref 26.6–33)
MCHC RBC AUTO-ENTMCNC: 33.8 G/DL (ref 31.5–35.7)
MCV RBC AUTO: 91.4 FL (ref 79–97)
MONOCYTES # BLD AUTO: 0.46 10*3/MM3 (ref 0.1–0.9)
MONOCYTES NFR BLD AUTO: 6 % (ref 5–12)
NEUTROPHILS NFR BLD AUTO: 4.47 10*3/MM3 (ref 1.7–7)
NEUTROPHILS NFR BLD AUTO: 57.9 % (ref 42.7–76)
NRBC BLD AUTO-RTO: 0 /100 WBC (ref 0–0.2)
PLATELET # BLD AUTO: 360 10*3/MM3 (ref 140–450)
PMV BLD AUTO: 9.5 FL (ref 6–12)
RBC # BLD AUTO: 4.86 10*6/MM3 (ref 3.77–5.28)
WBC NRBC COR # BLD AUTO: 7.71 10*3/MM3 (ref 3.4–10.8)

## 2025-04-01 PROCEDURE — 25010000002 MIDAZOLAM PER 1MG: Performed by: ANESTHESIOLOGY

## 2025-04-01 PROCEDURE — 25010000002 LIDOCAINE PF 2% 2 % SOLUTION: Performed by: NURSE ANESTHETIST, CERTIFIED REGISTERED

## 2025-04-01 PROCEDURE — 85025 COMPLETE CBC W/AUTO DIFF WBC: CPT | Performed by: OBSTETRICS & GYNECOLOGY

## 2025-04-01 PROCEDURE — 88341 IMHCHEM/IMCYTCHM EA ADD ANTB: CPT | Performed by: OBSTETRICS & GYNECOLOGY

## 2025-04-01 PROCEDURE — 25810000003 LACTATED RINGERS PER 1000 ML: Performed by: ANESTHESIOLOGY

## 2025-04-01 PROCEDURE — 88305 TISSUE EXAM BY PATHOLOGIST: CPT | Performed by: OBSTETRICS & GYNECOLOGY

## 2025-04-01 PROCEDURE — 88307 TISSUE EXAM BY PATHOLOGIST: CPT | Performed by: OBSTETRICS & GYNECOLOGY

## 2025-04-01 PROCEDURE — 25010000002 VASOPRESSIN 20 UNIT/ML SOLUTION 1 ML VIAL: Performed by: OBSTETRICS & GYNECOLOGY

## 2025-04-01 PROCEDURE — 88342 IMHCHEM/IMCYTCHM 1ST ANTB: CPT | Performed by: OBSTETRICS & GYNECOLOGY

## 2025-04-01 PROCEDURE — 57520 CONIZATION OF CERVIX: CPT | Performed by: OBSTETRICS & GYNECOLOGY

## 2025-04-01 PROCEDURE — 25010000002 PROPOFOL 10 MG/ML EMULSION: Performed by: NURSE ANESTHETIST, CERTIFIED REGISTERED

## 2025-04-01 RX ORDER — MAGNESIUM HYDROXIDE 1200 MG/15ML
LIQUID ORAL AS NEEDED
Status: DISCONTINUED | OUTPATIENT
Start: 2025-04-01 | End: 2025-04-01 | Stop reason: HOSPADM

## 2025-04-01 RX ORDER — OXYCODONE HYDROCHLORIDE 5 MG/1
5 TABLET ORAL
Status: DISCONTINUED | OUTPATIENT
Start: 2025-04-01 | End: 2025-04-01 | Stop reason: HOSPADM

## 2025-04-01 RX ORDER — ACETIC ACID 5 %
LIQUID (ML) MISCELLANEOUS AS NEEDED
Status: DISCONTINUED | OUTPATIENT
Start: 2025-04-01 | End: 2025-04-01 | Stop reason: HOSPADM

## 2025-04-01 RX ORDER — PROPOFOL 10 MG/ML
VIAL (ML) INTRAVENOUS AS NEEDED
Status: DISCONTINUED | OUTPATIENT
Start: 2025-04-01 | End: 2025-04-01 | Stop reason: SURG

## 2025-04-01 RX ORDER — PROMETHAZINE HYDROCHLORIDE 12.5 MG/1
25 TABLET ORAL ONCE AS NEEDED
Status: DISCONTINUED | OUTPATIENT
Start: 2025-04-01 | End: 2025-04-01 | Stop reason: HOSPADM

## 2025-04-01 RX ORDER — DEXMEDETOMIDINE HYDROCHLORIDE 100 UG/ML
INJECTION, SOLUTION INTRAVENOUS AS NEEDED
Status: DISCONTINUED | OUTPATIENT
Start: 2025-04-01 | End: 2025-04-01 | Stop reason: SURG

## 2025-04-01 RX ORDER — ONDANSETRON 4 MG/1
4 TABLET, ORALLY DISINTEGRATING ORAL ONCE AS NEEDED
Status: DISCONTINUED | OUTPATIENT
Start: 2025-04-01 | End: 2025-04-01 | Stop reason: HOSPADM

## 2025-04-01 RX ORDER — IBUPROFEN 600 MG/1
600 TABLET, FILM COATED ORAL EVERY 6 HOURS PRN
Status: DISCONTINUED | OUTPATIENT
Start: 2025-04-01 | End: 2025-04-01 | Stop reason: HOSPADM

## 2025-04-01 RX ORDER — EPHEDRINE SULFATE 50 MG/ML
INJECTION INTRAVENOUS AS NEEDED
Status: DISCONTINUED | OUTPATIENT
Start: 2025-04-01 | End: 2025-04-01 | Stop reason: SURG

## 2025-04-01 RX ORDER — ACETAMINOPHEN AND CODEINE PHOSPHATE 300; 30 MG/1; MG/1
1 TABLET ORAL EVERY 6 HOURS PRN
Qty: 4 TABLET | Refills: 0 | Status: SHIPPED | OUTPATIENT
Start: 2025-04-01

## 2025-04-01 RX ORDER — MIDAZOLAM HYDROCHLORIDE 2 MG/2ML
2 INJECTION, SOLUTION INTRAMUSCULAR; INTRAVENOUS ONCE
Status: COMPLETED | OUTPATIENT
Start: 2025-04-01 | End: 2025-04-01

## 2025-04-01 RX ORDER — IBUPROFEN 600 MG/1
600 TABLET, FILM COATED ORAL EVERY 6 HOURS PRN
Qty: 30 TABLET | Refills: 0 | Status: SHIPPED | OUTPATIENT
Start: 2025-04-01

## 2025-04-01 RX ORDER — PROMETHAZINE HYDROCHLORIDE 25 MG/1
25 SUPPOSITORY RECTAL ONCE AS NEEDED
Status: DISCONTINUED | OUTPATIENT
Start: 2025-04-01 | End: 2025-04-01 | Stop reason: HOSPADM

## 2025-04-01 RX ORDER — ACETAMINOPHEN 325 MG/1
650 TABLET ORAL EVERY 6 HOURS PRN
Qty: 30 TABLET | Refills: 0 | Status: SHIPPED | OUTPATIENT
Start: 2025-04-01

## 2025-04-01 RX ORDER — SODIUM CHLORIDE, SODIUM LACTATE, POTASSIUM CHLORIDE, CALCIUM CHLORIDE 600; 310; 30; 20 MG/100ML; MG/100ML; MG/100ML; MG/100ML
9 INJECTION, SOLUTION INTRAVENOUS CONTINUOUS PRN
Status: DISCONTINUED | OUTPATIENT
Start: 2025-04-01 | End: 2025-04-01 | Stop reason: HOSPADM

## 2025-04-01 RX ORDER — ACETAMINOPHEN 500 MG
1000 TABLET ORAL ONCE
Status: COMPLETED | OUTPATIENT
Start: 2025-04-01 | End: 2025-04-01

## 2025-04-01 RX ORDER — ONDANSETRON 2 MG/ML
4 INJECTION INTRAMUSCULAR; INTRAVENOUS ONCE AS NEEDED
Status: DISCONTINUED | OUTPATIENT
Start: 2025-04-01 | End: 2025-04-01 | Stop reason: HOSPADM

## 2025-04-01 RX ORDER — LIDOCAINE HYDROCHLORIDE 20 MG/ML
INJECTION, SOLUTION EPIDURAL; INFILTRATION; INTRACAUDAL; PERINEURAL AS NEEDED
Status: DISCONTINUED | OUTPATIENT
Start: 2025-04-01 | End: 2025-04-01 | Stop reason: SURG

## 2025-04-01 RX ADMIN — MIDAZOLAM HYDROCHLORIDE 2 MG: 1 INJECTION, SOLUTION INTRAMUSCULAR; INTRAVENOUS at 09:53

## 2025-04-01 RX ADMIN — ACETAMINOPHEN 1000 MG: 500 TABLET, FILM COATED ORAL at 09:52

## 2025-04-01 RX ADMIN — EPHEDRINE SULFATE 15 MG: 50 INJECTION INTRAVENOUS at 10:48

## 2025-04-01 RX ADMIN — DEXMEDETOMIDINE 10 MCG: 100 INJECTION, SOLUTION, CONCENTRATE INTRAVENOUS at 10:18

## 2025-04-01 RX ADMIN — OXYCODONE HYDROCHLORIDE 5 MG: 5 TABLET ORAL at 11:25

## 2025-04-01 RX ADMIN — DEXMEDETOMIDINE 10 MCG: 100 INJECTION, SOLUTION, CONCENTRATE INTRAVENOUS at 10:01

## 2025-04-01 RX ADMIN — EPHEDRINE SULFATE 10 MG: 50 INJECTION INTRAVENOUS at 10:25

## 2025-04-01 RX ADMIN — SODIUM CHLORIDE, SODIUM LACTATE, POTASSIUM CHLORIDE, CALCIUM CHLORIDE 9 ML/HR: 20; 30; 600; 310 INJECTION, SOLUTION INTRAVENOUS at 09:53

## 2025-04-01 RX ADMIN — DEXMEDETOMIDINE 10 MCG: 100 INJECTION, SOLUTION, CONCENTRATE INTRAVENOUS at 10:26

## 2025-04-01 RX ADMIN — PROPOFOL 250 MCG/KG/MIN: 10 INJECTION, EMULSION INTRAVENOUS at 10:02

## 2025-04-01 RX ADMIN — PROPOFOL 60 MG: 10 INJECTION, EMULSION INTRAVENOUS at 10:02

## 2025-04-01 RX ADMIN — LIDOCAINE HYDROCHLORIDE 60 MG: 20 INJECTION, SOLUTION INTRAVENOUS at 10:02

## 2025-04-01 NOTE — ANESTHESIA POSTPROCEDURE EVALUATION
Patient: Janee Mensah    Procedure Summary       Date: 04/01/25 Room / Location: [facility] OR [address] [place] [address] OR [facility]    Anesthesia Start: 0956 Anesthesia Stop: 1050    Procedure: CERVICAL CONIZATION (Cervix) Diagnosis:       Mild cervical dysplasia, histologically confirmed      (Mild cervical dysplasia, histologically confirmed [N87.0])    Surgeons: Kristel Galeas DO Provider: Zeeshan Agosto MD    Anesthesia Type: general ASA Status: 1            Anesthesia Type: general    Vitals  Vitals Value Taken Time   /81 04/01/25 10:56   Temp 36.6 °C (97.8 °F) 04/01/25 10:46   Pulse 94 04/01/25 10:59   Resp 16 04/01/25 10:55   SpO2 98 % 04/01/25 10:59   Vitals shown include unfiled device data.        Post Anesthesia Care and Evaluation    Patient location during evaluation: bedside  Patient participation: complete - patient participated  Level of consciousness: awake    Airway patency: patent  PONV Status: none  Cardiovascular status: acceptable  Respiratory status: acceptable  Hydration status: acceptable

## 2025-04-01 NOTE — DISCHARGE INSTRUCTIONS
DISCHARGE INSTRUCTIONS  GYNECOLOGICAL  PROCEDURES      For your surgery you had:  General anesthesia (you may have a sore throat for the first 24 hours)  You may experience dizziness, drowsiness, or lightheadedness for several hours following surgery.  Do not stay alone today or tonight.  Limit your activity for 24 hours.  Resume your diet slowly.  Follow any special dietary instructions you may have been given by your doctor.  You should not drive or operate machinery, drink alcohol, or sign legally binding documents for 24 hours or while you are taking pain medication.  Last dose of pain medication was given at:  .  NOTIFY YOUR DOCTOR IF YOU EXPERIENCE ANY OF THE FOLLOWING:  Temperature greater than 101 degrees Fahrenheit  Shaking Chills  Redness or excessive drainage from incision  Nausea, vomiting and/or pain that is not controlled by prescribed medications  Increase in bleeding or bleeding that is excessive  Unable to urinate in 6 hours after surgery  If unable to reach your doctor, please go to the closest Emergency Room [] You may resume intercourse and the use of tampons as your physician has instructed you.  [] Nothing in the vagina for 6 weeks to include intercourse, douches, or tampons.  [] Vaginal bleeding may be expected for several days with flow decreasing with time and never any heavier than a normal   period.  If you have an ablation, vaginal discharge is expected after bleeding stops.   If you have foul smelling discharge, notify your physician.  Medications per physician instructions as indicated on Discharge Medication Information Sheet.  SPECIAL INSTRUCTIONS:

## 2025-04-01 NOTE — OP NOTE
CERVICAL CONIZATION  Procedure Report    Patient Name:  Janee Mensah  YOB: 1980    Date of Surgery:  4/1/2025     Indications: Cervical dysplasia    Pre-op Diagnosis:   Mild cervical dysplasia, histologically confirmed [N87.0]       Post-Op Diagnosis Codes:     * Mild cervical dysplasia, histologically confirmed [N87.0]    Procedure/CPT® Codes:  No CPT Code Applied in Case Entry    Procedure(s):  CERVICAL CONIZATION    Staff:  Surgeon(s):  Kristel Galeas DO         Anesthesia: Choice    Estimated Blood Loss: 15 mL    Implants:    Nothing was implanted during the procedure    Specimen:          Specimens       ID Source Type Tests Collected By Collected At Frozen?    A Cervix Tissue TISSUE PATHOLOGY EXAM   Kristel Galeas, DO 4/1/25 1031     Description: portion of cervix    Comment: open @ 3 oclock,silk stitch @ 12 oclock,PDS stitch @ 6 oclock    B Endocervix Tissue TISSUE PATHOLOGY EXAM   Kristel Galeas,  4/1/25 1034     Description: endocervical curettings                Findings: Normal cervix    Complications: None    Description of Procedure:After the appropriate consents had been obtained, the patient was taken to the operative suite where she was placed in supine position and underwent general endotracheal intubation.  After an adequate level of anesthesia been obtained, the patient was placed in the high lithotomy position and prepped and draped in usual sterile fashion.  A weighted speculum was placed in the posterior vaginal vault and a Sharpe retractor was placed in the anterior vaginal vault to view the cervix.  Lugol's solution was applied to the cervix to identify the extent of the lesion.  Dilute vasopressin was then injected into the cervix to help with intraoperative and postoperative bleeding.  Two stay sutures were placed at the 3 and 9 o'clock positions using a suture of 0 Vicryl.  The cervical portio was then incised with a scalpel outside of the lesion that  had previously been identified.  A suture of 3-0 silk was placed to identify the 12 o'clock position and 3-0 Prolene was placed to identify the 6 o'clock position.  The remainder of the cone biopsy was completed using a combination of curved Mayos and a scalpel.  An ECC was performed in the standard fashion and handed off the field and marked as endocervical curettings.  The cone specimen was removed and opened at the 3 o'clock position.  The specimen was attached to a tongue blade using 2 18-gauge angiocaths and handed off the field.  Hemostasis was obtained using electrocautery.  After adequate hemostasis had been obtained, astringent was applied to the cone bed.  The 2 stay sutures were tied across the midline.  The procedure was terminated and the speculum was removed from the vagina.  The patient was taken out of the high lithotomy position.  She was awakened, extubated and transferred to the recovery room in stable and satisfactory condition.  The sponge counts and instrument counts were verified as correct.          Kristel Galeas,      Date: 4/1/2025  Time: 10:45 EDT

## 2025-04-01 NOTE — ANESTHESIA PREPROCEDURE EVALUATION
Anesthesia Evaluation     Patient summary reviewed and Nursing notes reviewed                Airway   Mallampati: I  TM distance: >3 FB  Neck ROM: full  No difficulty expected  Dental      Pulmonary - negative pulmonary ROS and normal exam    breath sounds clear to auscultation  Cardiovascular - negative cardio ROS and normal exam    Rhythm: regular  Rate: normal        Neuro/Psych- negative ROS  GI/Hepatic/Renal/Endo - negative ROS     Musculoskeletal (-) negative ROS    Abdominal    Substance History - negative use     OB/GYN negative ob/gyn ROS         Other      history of cancer                Anesthesia Plan    ASA 1     general     intravenous induction     Anesthetic plan, risks, benefits, and alternatives have been provided, discussed and informed consent has been obtained with: patient.    CODE STATUS:

## 2025-04-03 LAB
CYTO UR: NORMAL
LAB AP CASE REPORT: NORMAL
LAB AP CLINICAL INFORMATION: NORMAL
LAB AP SPECIAL STAINS: NORMAL
PATH REPORT.FINAL DX SPEC: NORMAL
PATH REPORT.GROSS SPEC: NORMAL

## 2025-04-04 ENCOUNTER — RESULTS FOLLOW-UP (OUTPATIENT)
Dept: PERIOP | Facility: HOSPITAL | Age: 45
End: 2025-04-04
Payer: MEDICAID

## 2025-04-04 NOTE — TELEPHONE ENCOUNTER
Called pt to disc results/ pt voiced understanding and all her questions were answered. Pt reminded of post op appt and confirmed she would be here.

## 2025-04-04 NOTE — PROGRESS NOTES
Chief Complaint  Anxiety    Subjective          Janee Mensah presents to [facility]  History of Present Illness    History of Present Illness    Has a hearing aid, having difficulty hearing. Wants HEENT referral.     Reports black vaginal discharge post colonization. Just when wiping. Denies abdominal pain, active bleeding. Denies rectal concerns.     Depression  Presents for follow-up visit. Symptoms include decreased concentration, depressed mood, excessive worry, insomnia, irritability, nervousness/anxiety and malaise/fatigue. Patient is not experiencing: compulsions, confusion, dry mouth, hyperventilation, impotence, muscle tension, obsessions, palpitations, panic, shortness of breath, suicidal ideas, thoughts of death, chest pain, feeling of choking, dizziness and nausea.  Her past medical history is significant for depression.    Anxiety  Presents for follow up visit. Symptoms include depressed mood, excessive worry, irritability, nervous/anxious behavior and panic. Patient reports no chest pain, compulsions, confusion, decreased concentration, dizziness, dry mouth, feeling of choking, hyperventilation, impotence, insomnia, malaise, muscle tension, nausea, obsessions, palpitations, restlessness, shortness of breath or suicidal ideas.     Risk factors:  passed away 6 years ago  Treatments tried:  hydroxyzine and lamictal        Insomnia  This is a chronic problem. Pertinent negatives include no abdominal pain, anorexia, arthralgias, change in bowel habit, chest pain, chills, congestion, coughing, diaphoresis, fatigue, fever, headaches, joint swelling, myalgias, nausea, neck pain, numbness, rash, sore throat, swollen glands, urinary symptoms, vertigo, visual change, vomiting or weakness. Treatments tried: trazodone  The treatment provided significant relief.   Current Outpatient Medications   Medication Instructions    acetaminophen (TYLENOL) 650  "mg, Oral, Every 6 Hours PRN    acetaminophen-codeine (TYLENOL with CODEINE #3) 300-30 MG per tablet 1 tablet, Oral, Every 6 Hours PRN    albuterol sulfate  (90 Base) MCG/ACT inhaler 2 puffs, Inhalation, Every 4 Hours PRN    ibuprofen (ADVIL,MOTRIN) 600 mg, Oral, Every 6 Hours PRN    traZODone (DESYREL) 150 mg, Oral, Nightly       The following portions of the patient's history were reviewed and updated as appropriate: allergies, current medications, past family history, past medical history, past social history, past surgical history, and problem list.    Objective   Vital Signs:   /82 (BP Location: Right arm) Comment (BP Location): MANUAL  Pulse 90   Temp 98.4 °F (36.9 °C) (Temporal)   Ht 167.6 cm (65.98\")   Wt 55.4 kg (122 lb 3.2 oz)   SpO2 97%   BMI 19.73 kg/m²     BP Readings from Last 3 Encounters:   04/11/25 110/82   04/01/25 136/80   03/06/25 114/81     Wt Readings from Last 3 Encounters:   04/11/25 55.4 kg (122 lb 3.2 oz)   04/01/25 55.6 kg (122 lb 9.2 oz)   03/06/25 55.8 kg (123 lb)     BMI is within normal parameters. No other follow-up for BMI required.     Physical Exam     Appearance: No acute distress, well-nourished  Head: normocephalic, atraumatic  Eyes: extraocular movements intact, no scleral icterus, no conjunctival injection  Ears, Nose, and Throat: external ears normal, nares patent, moist mucous membranes  Cardiovascular: regular rate and rhythm. no murmurs, rubs, or gallops. no edema  Respiratory: breathing comfortably, symmetric chest rise, clear to auscultation bilaterally. No wheezes, rales, or rhonchi.  Neuro: alert and oriented to time, place, and person. Normal gait  Psych: normal mood and affect     Physical Exam        Result Review :   The following data was reviewed by: ANGY Limon on 04/11/2025:  Common labs          8/16/2024    15:57 2/25/2025    11:42 4/1/2025    09:31   Common Labs   Glucose 80  77     BUN 4  7     Creatinine 0.64  0.63   "   Sodium 137  138     Potassium 3.8  4.6     Chloride 101  103     Calcium 9.9  10.3     Albumin 4.7  4.5     Total Bilirubin 0.2  0.2     Alkaline Phosphatase 76  76     AST (SGOT) 19  26     ALT (SGPT) 9  20     WBC 8.67  8.17  7.71    Hemoglobin 14.5  15.7  15.0    Hematocrit 43.4  47.6  44.4    Platelets 325  385  360    Total Cholesterol 215  266     Triglycerides 69  103     HDL Cholesterol 55  55     LDL Cholesterol  148  193         Results           Lab Results   Component Value Date    COVID19 Detected (C) 01/22/2022    POCPREGUR Negative 01/30/2025    BILIRUBINUR Negative 02/25/2025            Assessment and Plan    Diagnoses and all orders for this visit:    1. CAROLINA (generalized anxiety disorder) (Primary)    2. Screening for colon cancer  -     Ambulatory Referral For Screening Colonoscopy    3. Insomnia, unspecified type  -     traZODone (DESYREL) 150 MG tablet; Take 1 tablet by mouth Every Night.  Dispense: 90 tablet; Refill: 1      Stopped taking anxiety medications because she did not like how it made her feel. Reports she is managing well without.     Has conization and having some bloody discharge. Reassured her this is normal after surgery. If symptoms worsen or persists, call GYN or go to ER for eval.   Admission (Discharged) with Kristel Galeas DO (04/01/2025)   Assessment & Plan      Medications Discontinued During This Encounter   Medication Reason    traZODone (DESYREL) 100 MG tablet *Therapy completed    traZODone (DESYREL) 150 MG tablet Reorder          Follow Up   No follow-ups on file.  Patient was given instructions and counseling regarding her condition or for health maintenance advice. Please see specific information pulled into the AVS if appropriate.       ANGY Limon  04/16/25  08:11 EDT      Patient or patient representative verbalized consent for the use of Ambient Listening during the visit with  ANGY Limon for chart documentation. 4/16/2025   11:09 EDT

## 2025-04-11 ENCOUNTER — OFFICE VISIT (OUTPATIENT)
Dept: INTERNAL MEDICINE | Facility: CLINIC | Age: 45
End: 2025-04-11
Payer: MEDICAID

## 2025-04-11 VITALS
HEIGHT: 66 IN | SYSTOLIC BLOOD PRESSURE: 110 MMHG | DIASTOLIC BLOOD PRESSURE: 82 MMHG | OXYGEN SATURATION: 97 % | WEIGHT: 122.2 LBS | HEART RATE: 90 BPM | TEMPERATURE: 98.4 F | BODY MASS INDEX: 19.64 KG/M2

## 2025-04-11 DIAGNOSIS — G47.00 INSOMNIA, UNSPECIFIED TYPE: Chronic | ICD-10-CM

## 2025-04-11 DIAGNOSIS — F41.1 GAD (GENERALIZED ANXIETY DISORDER): Primary | Chronic | ICD-10-CM

## 2025-04-11 DIAGNOSIS — Z12.11 SCREENING FOR COLON CANCER: ICD-10-CM

## 2025-04-11 RX ORDER — TRAZODONE HYDROCHLORIDE 150 MG/1
150 TABLET ORAL NIGHTLY
COMMUNITY
Start: 2025-03-03 | End: 2025-04-16 | Stop reason: SDUPTHER

## 2025-04-16 RX ORDER — TRAZODONE HYDROCHLORIDE 150 MG/1
150 TABLET ORAL NIGHTLY
Qty: 90 TABLET | Refills: 1 | Status: SHIPPED | OUTPATIENT
Start: 2025-04-16

## 2025-04-18 ENCOUNTER — TELEPHONE (OUTPATIENT)
Dept: INTERNAL MEDICINE | Facility: CLINIC | Age: 45
End: 2025-04-18
Payer: MEDICAID

## 2025-04-18 NOTE — TELEPHONE ENCOUNTER
"Attempted to contact patient. Left message to call the office back    Relay   HUB ok to read/advise  \"Patient did not get follow-up scheduled after her appointment with Carlee.   Patient needs to be scheduled for annual physical 08/16/2025 or after.\"        "

## 2025-04-18 NOTE — TELEPHONE ENCOUNTER
Name: Janee Mensah    Relationship: Self    Best Callback Number:     [phone]       HUB PROVIDED THE RELAY MESSAGE FROM THE OFFICE   PATIENT VOICED UNDERSTANDING AND HAS NO FURTHER QUESTIONS AT THIS TIME    ADDITIONAL INFORMATION: PATIENT STATES THAT SHE DOES NOT WANT TO SCHEDULE

## 2025-05-05 NOTE — PROGRESS NOTES
"Post Operative Visit        Chief Complaint   Patient presents with    Post-op     CERVICAL CONIZATION     HPI:   Pain:  no  Vaginal bleeding:  yes, light spotting   Vaginal discharge:  no  Fever/chills:  no  Good appetite:  yes  Normal bladder function:  yes  Normal bowel function:  yes  Hot flashes: no    PHYSICAL EXAM:  BP 90/62   Pulse 67   Ht 167.6 cm (65.98\")   Wt 55.3 kg (122 lb)   Breastfeeding No   BMI 19.70 kg/m²    Chaperone present during breast and/or pelvic exam if performed.  General- NAD, alert and oriented, appropriate  Psych- Normal mood, good memory      External genitalia- Normal, no lesions  Urethra- Normal, no masses, non tender  Bladder- Normal, no masses, non tender  Vagina- NL no atrophy, no discharge     Cervix- LEEP/CKC bed healing well  Uterus- Normal size, shape & consistency.  Non tender, mobile.  Adnexa- No mass, non tender    Lymphatic- No palpable groin nodes  Extremities- No edema, no cyanosis   Skin- No lesions, no rashes    Tissue Pathology Exam (04/01/2025 10:31)    Op Note by Kristel Galeas DO (04/01/2025 10:18)    ASSESSMENT and PLAN:  Post-operative exam    Diagnoses and all orders for this visit:    1. Postoperative follow-up (Primary)        Operative report, surgical findings and any pathology/photos reviewed.  All questions answered.     Counseling:   Ok to resume normal activities  Ok to resume intercourse  Return to school/work without limitations      Follow Up:  Return in about 6 months (around 11/7/2025) for pap.            Kristel Galeas DO  05/07/2025    [facility] [address] [place] [address]  Dept: [phone]  Dept Fax: [phone]  Loc: [phone]  Loc Fax: [phone]   "

## 2025-05-07 ENCOUNTER — OFFICE VISIT (OUTPATIENT)
Dept: OBSTETRICS AND GYNECOLOGY | Age: 45
End: 2025-05-07
Payer: MEDICAID

## 2025-05-07 VITALS
SYSTOLIC BLOOD PRESSURE: 90 MMHG | HEART RATE: 67 BPM | WEIGHT: 122 LBS | DIASTOLIC BLOOD PRESSURE: 62 MMHG | BODY MASS INDEX: 19.61 KG/M2 | HEIGHT: 66 IN

## 2025-05-07 DIAGNOSIS — Z09 POSTOPERATIVE FOLLOW-UP: Primary | ICD-10-CM

## 2025-05-07 PROCEDURE — 99024 POSTOP FOLLOW-UP VISIT: CPT | Performed by: OBSTETRICS & GYNECOLOGY

## 2025-07-29 ENCOUNTER — TELEPHONE (OUTPATIENT)
Dept: SURGERY | Facility: CLINIC | Age: 45
End: 2025-07-29
Payer: MEDICAID

## 2025-07-29 NOTE — TELEPHONE ENCOUNTER
Attempted to contact the patient to reschedule her appointment due to the provider’s unavailability. No answer--voicemail was left.

## (undated) DEVICE — SUT SILK 2/0 FS BLK 18IN 685G

## (undated) DEVICE — DRAPE,UNDERBUTTOCKS,PCH,STERILE: Brand: MEDLINE

## (undated) DEVICE — SOL LUGOLS

## (undated) DEVICE — MINOR-LF: Brand: MEDLINE INDUSTRIES, INC.

## (undated) DEVICE — VAGINAL PREP TRAY: Brand: MEDLINE INDUSTRIES, INC.

## (undated) DEVICE — SYR LL TP 10ML STRL

## (undated) DEVICE — PAD,SANITARY,11 IN,MAXI,N-STRL,IND WRAP: Brand: MEDLINE

## (undated) DEVICE — COAGULATOR SXN FTSWTCH 10F6IN

## (undated) DEVICE — PROCTO SWABS: Brand: DEROYAL

## (undated) DEVICE — ANTIBACTERIAL VIOLET BRAIDED (POLYGLACTIN 910), SYNTHETIC ABSORBABLE SUTURE: Brand: COATED VICRYL

## (undated) DEVICE — PACK,LITHOTOMY,PK I: Brand: MEDLINE

## (undated) DEVICE — DRSNG TELFA PAD NONADH STR 1S 3X4IN

## (undated) DEVICE — SYR CONTRL LUERLOK 10CC

## (undated) DEVICE — SUT PDS 3/0 SH 27IN DYED Z316H

## (undated) DEVICE — GLV SURG BIOGEL LTX PF 6 1/2

## (undated) DEVICE — ADAPT TBG PREFILTER 3/8TO1.33IN NS

## (undated) DEVICE — NEEDLE,18GX1.5",REG,BEVEL: Brand: MEDLINE

## (undated) DEVICE — CATH URETH INTRMIT ALLPURP LTX 16F RED

## (undated) DEVICE — BLADE, TONGUE, 6", STERILE: Brand: MEDLINE

## (undated) DEVICE — PREFLTR SMOKE/EVAC SURGIFRESH PROTECTION PLS 1 1/3IN

## (undated) DEVICE — STRAP STIRUP SLP RNG 19X3.5IN DISP

## (undated) DEVICE — INTENDED FOR TISSUE SEPARATION, AND OTHER PROCEDURES THAT REQUIRE A SHARP SURGICAL BLADE TO PUNCTURE OR CUT.: Brand: BARD-PARKER ® CARBON RIB-BACK BLADES

## (undated) DEVICE — SLV SCD KN/LEN ADJ EXPRSS BLENDED MD 1P/U

## (undated) DEVICE — TUBING, SUCTION, 1/4" X 10', STRAIGHT: Brand: MEDLINE